# Patient Record
Sex: FEMALE | Race: WHITE | NOT HISPANIC OR LATINO | Employment: FULL TIME | URBAN - METROPOLITAN AREA
[De-identification: names, ages, dates, MRNs, and addresses within clinical notes are randomized per-mention and may not be internally consistent; named-entity substitution may affect disease eponyms.]

---

## 2017-03-01 ENCOUNTER — GENERIC CONVERSION - ENCOUNTER (OUTPATIENT)
Dept: OTHER | Facility: OTHER | Age: 62
End: 2017-03-01

## 2017-04-20 ENCOUNTER — GENERIC CONVERSION - ENCOUNTER (OUTPATIENT)
Dept: OTHER | Facility: OTHER | Age: 62
End: 2017-04-20

## 2017-05-17 ENCOUNTER — ALLSCRIPTS OFFICE VISIT (OUTPATIENT)
Dept: OTHER | Facility: OTHER | Age: 62
End: 2017-05-17

## 2017-05-17 DIAGNOSIS — R73.9 HYPERGLYCEMIA: ICD-10-CM

## 2017-06-15 ENCOUNTER — GENERIC CONVERSION - ENCOUNTER (OUTPATIENT)
Dept: OTHER | Facility: OTHER | Age: 62
End: 2017-06-15

## 2017-08-29 ENCOUNTER — LAB CONVERSION - ENCOUNTER (OUTPATIENT)
Dept: OTHER | Facility: OTHER | Age: 62
End: 2017-08-29

## 2017-08-29 ENCOUNTER — GENERIC CONVERSION - ENCOUNTER (OUTPATIENT)
Dept: OTHER | Facility: OTHER | Age: 62
End: 2017-08-29

## 2017-08-29 LAB
A/G RATIO (HISTORICAL): 1.6 (CALC) (ref 1–2.5)
ALBUMIN SERPL BCP-MCNC: 4.1 G/DL (ref 3.6–5.1)
ALP SERPL-CCNC: 68 U/L (ref 33–130)
ALT SERPL W P-5'-P-CCNC: 16 U/L (ref 6–29)
AST SERPL W P-5'-P-CCNC: 23 U/L (ref 10–35)
BILIRUB SERPL-MCNC: 0.5 MG/DL (ref 0.2–1.2)
BUN SERPL-MCNC: 17 MG/DL (ref 7–25)
BUN/CREA RATIO (HISTORICAL): ABNORMAL (CALC) (ref 6–22)
CALCIUM SERPL-MCNC: 9.1 MG/DL (ref 8.6–10.4)
CHLORIDE SERPL-SCNC: 106 MMOL/L (ref 98–110)
CHOLEST SERPL-MCNC: 154 MG/DL
CHOLEST/HDLC SERPL: 2.8 (CALC)
CO2 SERPL-SCNC: 22 MMOL/L (ref 20–31)
CREAT SERPL-MCNC: 0.68 MG/DL (ref 0.5–0.99)
EGFR AFRICAN AMERICAN (HISTORICAL): 110 ML/MIN/1.73M2
EGFR-AMERICAN CALC (HISTORICAL): 95 ML/MIN/1.73M2
GAMMA GLOBULIN (HISTORICAL): 2.5 G/DL (CALC) (ref 1.9–3.7)
GLUCOSE (HISTORICAL): 100 MG/DL (ref 65–99)
HBA1C MFR BLD HPLC: 5.2 % OF TOTAL HGB
HDLC SERPL-MCNC: 56 MG/DL
LDL CHOLESTEROL (HISTORICAL): 76 MG/DL (CALC)
NON-HDL-CHOL (CHOL-HDL) (HISTORICAL): 98 MG/DL (CALC)
POTASSIUM SERPL-SCNC: 4.3 MMOL/L (ref 3.5–5.3)
SODIUM SERPL-SCNC: 139 MMOL/L (ref 135–146)
TOTAL PROTEIN (HISTORICAL): 6.6 G/DL (ref 6.1–8.1)
TRIGL SERPL-MCNC: 139 MG/DL

## 2017-09-06 ENCOUNTER — GENERIC CONVERSION - ENCOUNTER (OUTPATIENT)
Dept: OTHER | Facility: OTHER | Age: 62
End: 2017-09-06

## 2017-09-20 ENCOUNTER — HOSPITAL ENCOUNTER (OUTPATIENT)
Dept: RADIOLOGY | Age: 62
Discharge: HOME | End: 2017-09-20
Payer: COMMERCIAL

## 2017-09-20 DIAGNOSIS — Z12.31 ENCOUNTER FOR SCREENING MAMMOGRAM FOR MALIGNANT NEOPLASM OF BREAST: ICD-10-CM

## 2017-09-20 PROCEDURE — G0202 SCR MAMMO BI INCL CAD: HCPCS

## 2017-11-13 ENCOUNTER — ALLSCRIPTS OFFICE VISIT (OUTPATIENT)
Dept: OTHER | Facility: OTHER | Age: 62
End: 2017-11-13

## 2017-11-14 NOTE — PROGRESS NOTES
Assessment    1  Hypertension (401 9) (I10)   2  Arteriosclerotic cardiovascular disease (429 2,440 9) (I25 10)   3  Hyperglycemia (790 29) (R73 9)   4  Hypercholesterolemia (272 0) (E78 00)   5  Obesity (278 00) (E66 9)    Plan  Hypercholesterolemia    · (1) LIPID PANEL, FASTING; Status:Active; Requested for:07Jwt4415;   Hyperglycemia    · (1) HEMOGLOBIN A1C; Status:Active; Requested for:50Ner1205;   Hypertension    · (1) COMPREHENSIVE METABOLIC PANEL; Status:Active; Requested for:00Zst8815; Discussion/Summary    Patient presents today for follow-up for her chronic health issues  She is feeling well clinically  Blood pressure was a bit elevated today and I would like her to check her blood pressure intermittently at home and let me know if it is consistently above 140/90, as I would bump up her metoprolol  She will have routine follow-up with Cardiology  I would like her to have some blood work done in 6 months including an A1c as her glucose readings have been just slightly elevated recently  She has gained some weight over the past several years and I would ask her to start working on decreasing the weight  has what appears to be a mucinous cyst on her left middle finger  Since she has already tried to drain this is several times, I did try cryotherapy getting a sustain freeze of the wound for about 30 seconds  Notify me if it is not improving and I can set her up for a surgical excision  Chief Complaint  6M Follow up - HTNwith Flu shot      History of Present Illness  Patient presents today for follow-up for coronary disease  She has not had any problems with chest pain, shortness of breath or palpitations  She has never taken her nitroglycerin  She does remain on metoprolol without excessive fatigue or lightheadedness  has a history of hyperlipidemia tolerate statin therapy without myalgias  has a history of hyperglycemia but without diabetes  She denies polyuria or polydipsia  complains of a lesion on her left middle finger that came on several months ago  It has been ruptured by herself and a provider at work but continues to come back  It is annoying and intermittently painful  Review of Systems   Constitutional: recent weight gain, but-- no fever,-- not feeling poorly,-- no chills,-- not feeling tired-- and-- no recent weight loss  Eyes: no eye pain-- and-- no eyesight problems  Cardiovascular: the heart rate was not slow,-- no chest pain,-- no intermittent leg claudication,-- no palpitations-- and-- no lower extremity edema  Respiratory: no shortness of breath-- and-- no cough  Gastrointestinal: no abdominal pain,-- no nausea-- and-- no constipation  Musculoskeletal: no arthralgias  Integumentary: no rashes  Neurological: no headache,-- no numbness-- and-- no dizziness  Hematologic/Lymphatic: no tendency for easy bleeding-- and-- no tendency for easy bruising  Active Problems  1  Arteriosclerotic cardiovascular disease (429 2,440 9) (I25 10)   2  ASCVD (arteriosclerotic cardiovascular disease) (429 2,440 9) (I25 10)   3  Glaucoma (365 9) (H40 9)   4  Hypercholesterolemia (272 0) (E78 00)   5  Hyperglycemia (790 29) (R73 9)   6  Hypertension (401 9) (I10)   7  Need for Zostavax administration (V04 89) (Z23)   8  Obesity (278 00) (E66 9)   9  Screening for cervical cancer (V76 2) (Z12 4)    Past Medical History  1  History of Acute upper respiratory infection (465 9) (J06 9)   2  History of Avulsion fracture (829 0) (T14 8XXA)   3  History of Cellulitis of ankle (682 6) (L03 119)   4  History of Chronic fatigue (780 79) (R53 82)   5  History of Contact dermatitis due to poison ivy (692 6) (L23 7)   6  History of Edema (782 3) (R60 9)   7  History of acute bronchitis (V12 69) (Z87 09)   8  History of Knee pain, left (719 46) (M25 562)   9  History of Plantar fasciitis (728 71) (M72 2)   10  History of Tendonitis, Achilles (726 71) (M76 60)   11   History of Trigger middle finger of right hand (727 03) (M65 331)    The active problems and past medical history were reviewed and updated today  Surgical History  1  History of Cardiac Cath Procedure Outcome:   2  History of Cath Stent Placement   3  History of Cath Stent Placement   4  History of Cath Stent Placement   5  History of Complete Colonoscopy   6  History of Endoscopic Retrograde Cholangiopancreatography (ERCP)   7  History of Incisional Breast Biopsy    The surgical history was reviewed and updated today  Family History  Mother    1  Family history of Angina Pectoris   2  Family history of Diabetes Mellitus (V18 0)   3  Family history of Septicemia  Father    4  Family history of Laryngeal Cancer (V16 2)  Maternal Uncle    5  Family history of Colon Cancer (V16 0)    The family history was reviewed and updated today  Social History     · Alcohol Use (History)   · Former smoker (S92 70) (X78 211)  The social history was reviewed and updated today  Current Meds   1  Aspirin 325 MG Oral Tablet; Therapy: (Elnoria Halter) to Recorded   2  Caltrate 600+D TABS; Therapy: (Recorded:24Fzy5833) to Recorded   3  Flax Seed Oil CAPS; Therapy: (Elnoria Halter) to Recorded   4  Metoprolol Succinate ER 25 MG Oral Tablet Extended Release 24 Hour; TAKE 1 TABLET DAILY; Therapy: 09PCE4698 to (EFSJEPKE:98CYN1218)  Requested for: 85GXH4996; Last Rx:31Jan2017 Ordered   5  Nitrostat 0 4 MG Sublingual Tablet Sublingual; PLACE 1 TABLET UNDER THE TONGUE EVERY 5 MINUTES UP TO 3 DOSES AS NEEDED FOR CHEST PAIN; Therapy: 11ZCP7526 to (Evaluate:16Jun2017)  Requested for: 24WBK3901; Last Rx:21Wtx8126 Ordered   6  Simvastatin 20 MG Oral Tablet; take 1 tablet by mouth daily; Therapy: 83KFP9392 to 96 772368)  Requested for: 41DNZ7063; Last Rx:31Jan2017 Ordered    The medication list was reviewed and updated today  Allergies  1  EPINEPHrine Base AERS   2   Lisinopril TABS    Vitals  Vital Signs    Recorded: 86RZI0413 05:07PM Recorded: 44AUA1803 04:46PM   Heart Rate  60   Respiration  16   Systolic 247 374   Diastolic 88 88   Height  5 ft 2 in   Weight  228 lb    BMI Calculated  41 7   BSA Calculated  2 02   O2 Saturation  98, RA       Physical Exam   Constitutional  General appearance: No acute distress, well appearing and well nourished  Eyes  Conjunctiva and lids: No swelling, erythema or discharge  Pulmonary  Respiratory effort: No increased work of breathing or signs of respiratory distress  Auscultation of lungs: Clear to auscultation  Cardiovascular  Auscultation of heart: Normal rate and rhythm, normal S1 and S2, without murmurs  Abdomen  Abdomen: Non-tender, no masses  Skin  Skin and subcutaneous tissue: Normal without rashes or lesions  Examination of the skin for lesions: Abnormal  -- She has a raised red lesion that is 5 x 5 mm on her dorsal left middle finger  Neurologic  Cranial nerves: Cranial nerves 2-12 intact  Psychiatric  Orientation to person, place, and time: Normal    Mood and affect: Normal          Health Management  Screening for cervical cancer   (1) THIN PREP PAP FOLLOW UP WITH IMAGING; every 3 years; Next Due: [id number]; Overdue  (1) THIN PREP PAP WITH IMAGING; every 1 year; Last 28IFV2880; Next Due: 36TAU6802; Overdue  Health Maintenance   Digital Bilateral Screening Mammogram With CAD; every 1 year; Last 00Wyg7582; Next OGV:69WBB1931;  Overdue    Signatures   Electronically signed by : STEPHAN Enamorado ; Nov 13 2017  5:15PM EST                       (Author)

## 2018-01-09 NOTE — RESULT NOTES
Verified Results  (1) LIPID PANEL, FASTING 52Mxq5551 12:00AM Elizabeth Hargrove     Test Name Result Flag Reference   CHOLESTEROL, TOTAL 154 mg/dL  <200   HDL CHOLESTEROL 56 mg/dL  >68   TRIGLICERIDES 341 mg/dL  <150   LDL-CHOLESTEROL 76 mg/dL (calc)     Reference range: <100     Desirable range <100 mg/dL for patients with CHD or  diabetes and <70 mg/dL for diabetic patients with  known heart disease  The [facility] calculation is a validated novel   method that provides better accuracy than the   Friedewald equation in the estimation of LDL-C,   particularly when TG levels are 150-400 mg/dL and   LDL-C levels are lower than 70 mg/dL  Reference:  Chucho Manzo et al  Comparison of a Novel   Method vs the Friedewald Equation for Estimating   Low-Density Lipoprotein Cholesterol Levels From the   Standard Lipid Profile  DIDI  6996;462(61): 0246-1981  For additional information, please refer to  http://Britely/faq/TTB073  (This link is being provided for informational/  educational purposes only )   CHOL/HDLC RATIO 2 8 (calc)  <5 0   NON HDL CHOLESTEROL 98 mg/dL (calc)  <130   For patients with diabetes plus 1 major ASCVD risk   factor, treating to a non-HDL-C goal of <100 mg/dL   (LDL-C of <70 mg/dL) is considered a therapeutic   option  (1) COMPREHENSIVE METABOLIC PANEL 14VRP1740 57:94RW Dayanna Palumbo     Test Name Result Flag Reference   GLUCOSE 100 mg/dL H 65-99   Fasting reference interval     For someone without known diabetes, a glucose value  between 100 and 125 mg/dL is consistent with  prediabetes and should be confirmed with a  follow-up test    UREA NITROGEN (BUN) 17 mg/dL  7-25   CREATININE 0 68 mg/dL  0 50-0 99   For patients >52years of age, the reference limit  for Creatinine is approximately 13% higher for people  identified as -American  eGFR NON-AFR   AMERICAN 95 mL/min/1 73m2  > OR = 60   eGFR AFRICAN AMERICAN 110 mL/min/1 73m2  > OR = 60 BUN/CREATININE RATIO   3-63   NOT APPLICABLE (calc)   SODIUM 139 mmol/L  135-146   POTASSIUM 4 3 mmol/L  3 5-5 3   CHLORIDE 106 mmol/L     CARBON DIOXIDE 22 mmol/L  20-31   CALCIUM 9 1 mg/dL  8 6-10 4   PROTEIN, TOTAL 6 6 g/dL  6 1-8 1   ALBUMIN 4 1 g/dL  3 6-5 1   GLOBULIN 2 5 g/dL (calc)  1 9-3 7   ALBUMIN/GLOBULIN RATIO 1 6 (calc)  1 0-2 5   BILIRUBIN, TOTAL 0 5 mg/dL  0 2-1 2   ALKALINE PHOSPHATASE 68 U/L     AST 23 U/L  10-35   ALT 16 U/L  6-29     (Q) HEMOGLOBIN A1c 06Sjb2472 12:00AM Ryland Nails   REPORT COMMENT:  FASTING:YES     Test Name Result Flag Reference   HEMOGLOBIN A1c 5 2 % of total Hgb  <5 7   For the purpose of screening for the presence of  diabetes:     <5 7%       Consistent with the absence of diabetes  5 7-6 4%    Consistent with increased risk for diabetes              (prediabetes)  > or =6 5%  Consistent with diabetes     This assay result is consistent with a decreased risk  of diabetes  Currently, no consensus exists regarding use of  hemoglobin A1c for diagnosis of diabetes in children  According to American Diabetes Association (ADA)  guidelines, hemoglobin A1c <7 0% represents optimal  control in non-pregnant diabetic patients  Different  metrics may apply to specific patient populations  Standards of Medical Care in Diabetes(ADA)

## 2018-01-13 VITALS
RESPIRATION RATE: 16 BRPM | HEIGHT: 62 IN | HEART RATE: 60 BPM | WEIGHT: 228 LBS | BODY MASS INDEX: 41.96 KG/M2 | SYSTOLIC BLOOD PRESSURE: 148 MMHG | DIASTOLIC BLOOD PRESSURE: 88 MMHG | OXYGEN SATURATION: 98 %

## 2018-01-14 VITALS
HEIGHT: 62 IN | DIASTOLIC BLOOD PRESSURE: 90 MMHG | BODY MASS INDEX: 41.68 KG/M2 | WEIGHT: 226.5 LBS | SYSTOLIC BLOOD PRESSURE: 118 MMHG | HEART RATE: 60 BPM

## 2018-02-07 DIAGNOSIS — E78.00 HYPERCHOLESTEROLEMIA: ICD-10-CM

## 2018-02-07 DIAGNOSIS — I25.10 ASCVD (ARTERIOSCLEROTIC CARDIOVASCULAR DISEASE): Primary | ICD-10-CM

## 2018-02-07 PROBLEM — R73.9 HYPERGLYCEMIA: Status: ACTIVE | Noted: 2017-05-17

## 2018-02-07 RX ORDER — SIMVASTATIN 20 MG
TABLET ORAL
Qty: 90 TABLET | Refills: 3 | Status: SHIPPED | OUTPATIENT
Start: 2018-02-07 | End: 2019-02-15 | Stop reason: SDUPTHER

## 2018-02-07 RX ORDER — METOPROLOL SUCCINATE 25 MG/1
TABLET, EXTENDED RELEASE ORAL
Qty: 90 TABLET | Refills: 3 | Status: SHIPPED | OUTPATIENT
Start: 2018-02-07 | End: 2019-02-15 | Stop reason: SDUPTHER

## 2018-03-07 NOTE — PROGRESS NOTES
History of Present Illness    Revaccination   Vaccine Information: Vaccine(s) Given (names): Prevnar 13 12/01/2015  Pt called (attempt 1): [id number] nw  l/m  Pt called (attempt 2): [id number] 9795 nw   l/m  Pt called (attempt 3): [id number] 1641 nw   l/m  Letter Sent (Regular and Certified): [id number] nw    Revaccination Completed: [id number]  Active Problems    1  Arteriosclerotic cardiovascular disease (429 2,440 9) (I25 10)   2  ASCVD (arteriosclerotic cardiovascular disease) (429 2,440 9) (I25 10)   3  Glaucoma (365 9) (H40 9)   4  Hypercholesterolemia (272 0) (E78 00)   5  Hyperlipidemia (272 4) (E78 5)   6  Hypertension (401 9) (I10)   7  Need for vaccination with 13-polyvalent pneumococcal conjugate vaccine (V03 82) (Z23)   8  Obesity (278 00) (E66 9)   9  Screening for cervical cancer (V76 2) (Z12 4)    Immunizations   Influenza --- Verl Nones: [id number] Maki: 27-Sep-2013; Vanessa Del Valle: 01-Sep-2015; Series4:  04-Oct-2016   PPSV --- Verl Nones: 02-Dec-2007   Tdap --- Series1: [id number]     Prevnar 13 Intramuscular Suspension [id number]     Current Meds   1  Aspirin 325 MG Oral Tablet   2  Caltrate 600+D TABS   3  Flax Seed Oil CAPS   4  Metoprolol Succinate ER 25 MG Oral Tablet Extended Release 24 Hour; TAKE 1 TABLET   DAILY   5  Nitrostat 0 4 MG Sublingual Tablet Sublingual; PLACE 1 TABLET UNDER THE TONGUE   EVERY 5 MINUTES UP TO 3 DOSES AS NEEDED FOR CHEST PAIN   6  Simvastatin 20 MG Oral Tablet; take 1 tablet by mouth daily    Allergies    1  EPINEPHrine Base AERS   2  Lisinopril TABS    Future Appointments    Date/Time Provider Specialty Site   04/17/2017 04:00 PM STEPHAN Greer   [facility]     Signatures   Electronically signed by : STEPHAN Santillan ; Jan 6 2017 11:16AM EST

## 2018-05-13 DIAGNOSIS — I10 ESSENTIAL (PRIMARY) HYPERTENSION: ICD-10-CM

## 2018-05-13 DIAGNOSIS — E78.00 PURE HYPERCHOLESTEROLEMIA: ICD-10-CM

## 2018-05-13 DIAGNOSIS — R73.9 HYPERGLYCEMIA: ICD-10-CM

## 2018-05-18 LAB
ALBUMIN SERPL-MCNC: 4.4 G/DL (ref 3.6–5.1)
ALBUMIN/GLOB SERPL: 1.8 (CALC) (ref 1–2.5)
ALP SERPL-CCNC: 88 U/L (ref 33–130)
ALT SERPL-CCNC: 20 U/L (ref 6–29)
AST SERPL-CCNC: 28 U/L (ref 10–35)
BILIRUB SERPL-MCNC: 0.9 MG/DL (ref 0.2–1.2)
BUN SERPL-MCNC: 11 MG/DL (ref 7–25)
BUN/CREAT SERPL: NORMAL (CALC) (ref 6–22)
CALCIUM SERPL-MCNC: 9.7 MG/DL (ref 8.6–10.4)
CHLORIDE SERPL-SCNC: 105 MMOL/L (ref 98–110)
CHOLEST SERPL-MCNC: 108 MG/DL
CHOLEST/HDLC SERPL: 1.8 (CALC)
CO2 SERPL-SCNC: 24 MMOL/L (ref 20–31)
CREAT SERPL-MCNC: 0.8 MG/DL (ref 0.5–0.99)
GLOBULIN SER CALC-MCNC: 2.5 G/DL (CALC) (ref 1.9–3.7)
GLUCOSE SERPL-MCNC: 94 MG/DL (ref 65–99)
HBA1C MFR BLD: 5.2 % OF TOTAL HGB
HDLC SERPL-MCNC: 60 MG/DL
LDLC SERPL CALC-MCNC: 34 MG/DL (CALC)
NONHDLC SERPL-MCNC: 48 MG/DL (CALC)
POTASSIUM SERPL-SCNC: 4.5 MMOL/L (ref 3.5–5.3)
PROT SERPL-MCNC: 6.9 G/DL (ref 6.1–8.1)
SL AMB EGFR AFRICAN AMERICAN: 92 ML/MIN/1.73M2
SL AMB EGFR NON AFRICAN AMERICAN: 80 ML/MIN/1.73M2
SODIUM SERPL-SCNC: 140 MMOL/L (ref 135–146)
TRIGL SERPL-MCNC: 65 MG/DL

## 2018-05-22 RX ORDER — PERPHENAZINE/AMITRIPTYLINE HCL 2 MG-25 MG
TABLET ORAL
COMMUNITY

## 2018-05-22 RX ORDER — NITROGLYCERIN 0.4 MG/1
1 TABLET SUBLINGUAL
COMMUNITY
Start: 2011-05-06 | End: 2018-12-24 | Stop reason: SDUPTHER

## 2018-05-23 ENCOUNTER — OFFICE VISIT (OUTPATIENT)
Dept: FAMILY MEDICINE CLINIC | Facility: CLINIC | Age: 63
End: 2018-05-23
Payer: COMMERCIAL

## 2018-05-23 VITALS
RESPIRATION RATE: 16 BRPM | OXYGEN SATURATION: 97 % | BODY MASS INDEX: 40.85 KG/M2 | HEIGHT: 62 IN | WEIGHT: 222 LBS | DIASTOLIC BLOOD PRESSURE: 80 MMHG | HEART RATE: 60 BPM | SYSTOLIC BLOOD PRESSURE: 128 MMHG

## 2018-05-23 DIAGNOSIS — R73.9 HYPERGLYCEMIA: ICD-10-CM

## 2018-05-23 DIAGNOSIS — I25.10 ASCVD (ARTERIOSCLEROTIC CARDIOVASCULAR DISEASE): ICD-10-CM

## 2018-05-23 DIAGNOSIS — E78.00 HYPERCHOLESTEROLEMIA: ICD-10-CM

## 2018-05-23 DIAGNOSIS — I10 ESSENTIAL HYPERTENSION: ICD-10-CM

## 2018-05-23 DIAGNOSIS — Z23 NEED FOR ZOSTER VACCINE: ICD-10-CM

## 2018-05-23 DIAGNOSIS — E66.01 CLASS 3 SEVERE OBESITY DUE TO EXCESS CALORIES WITH SERIOUS COMORBIDITY AND BODY MASS INDEX (BMI) OF 40.0 TO 44.9 IN ADULT (HCC): ICD-10-CM

## 2018-05-23 DIAGNOSIS — Z00.00 WELL ADULT ON ROUTINE HEALTH CHECK: Primary | ICD-10-CM

## 2018-05-23 DIAGNOSIS — Z11.59 NEED FOR HEPATITIS C SCREENING TEST: ICD-10-CM

## 2018-05-23 DIAGNOSIS — Z23 NEED FOR SHINGLES VACCINE: ICD-10-CM

## 2018-05-23 DIAGNOSIS — G47.33 OBSTRUCTIVE SLEEP APNEA SYNDROME: ICD-10-CM

## 2018-05-23 PROCEDURE — 99396 PREV VISIT EST AGE 40-64: CPT | Performed by: FAMILY MEDICINE

## 2018-05-23 NOTE — PROGRESS NOTES
FAMILY PRACTICE HEALTH MAINTENANCE OFFICE VISIT  [facility]    NAME: Sunil Osuna  AGE: 65 y o  SEX: female  : 1955     DATE: 2018    Assessment and Plan     Problem List Items Addressed This Visit     ASCVD (arteriosclerotic cardiovascular disease)       Well controlled  Continue cardiology follow-up  Relevant Orders    Comprehensive metabolic panel    Hypercholesterolemia      Recent lipids were outstanding  Continue to monitor annually  Relevant Orders    Comprehensive metabolic panel    Hyperglycemia       Recent A1c was excellent  Repeat A1c in 6 months  Continue with exercise  Relevant Orders    HEMOGLOBIN A1C W/ EAG ESTIMATION    Essential hypertension       Blood pressure is very well controlled  Check CMP in 6 months         Relevant Orders    Comprehensive metabolic panel    Class 3 obesity in adult       Continue with daily exercise  I discussed possibly increasing her walking pace and distance a bit which may help with weight as well as persistent dietary regulations  Obstructive sleep apnea syndrome      Continue on CPAP  Other Visit Diagnoses     Well adult on routine health check    -  Primary    Need for hepatitis C screening test        Relevant Orders    Hepatitis C antibody    Need for zoster vaccine        Relevant Medications    Zoster Vac Recomb Adjuvanted ([address]) 48 MCG SUSR    Need for shingles vaccine                · Patient Counseling:   · Nutrition: Stressed importance of a well balanced diet, moderation of sodium/saturated fat, caloric balance and sufficient intake of fiber  · Exercise: Stressed the importance of regular exercise with a goal of 150 minutes per week  · Dental Health: Discussed daily flossing and brushing and regular dental visits     · Immunizations reviewed  Up-to-date on all vaccines except for Shingrix    I will give her a prescription for this   · Discussed benefits of screening   Patient is up-to-date  · Discussed the patient's BMI with her  The BMI is above average; BMI management plan is completed     Return in about 6 months (around 11/23/2018)  Chief Complaint     Chief Complaint   Patient presents with    Physical Exam       History of Present Illness     HPI    Well Adult Physical   Patient here for a comprehensive physical exam    Patient presents today for annual physical   She has history of coronary disease but has been doing quite well  She has exercise routinely  She has had no chest pain, shortness of breath, palpitations or lightheadedness  She has hyperlipidemia tolerate statin therapy without myalgias  She recently was diagnosed by her cardiologist with sleep apnea and is tolerating CPAP  Unfortunately, she has noted no significant increase in energy but denies excessive fatigue  She has history of obesity and elevated glucose  She denies polyuria or polydipsia  She is exercising daily and trying to watch her diet  She is up-to-date on mammogram and colonoscopy  She does not require DEXA at this time        Diet and Physical Activity  Diet: well balanced diet  Weight concerns: Patient has class 3 obesity (BMI >40)  Exercise: daily      Depression Screen  PHQ-9 Depression Screening    PHQ-9:    Frequency of the following problems over the past two weeks:       Little interest or pleasure in doing things:  0 - not at all  Feeling down, depressed, or hopeless:  0 - not at all  PHQ-2 Score:  0          General Health  Hearing: Normal:  bilateral  Vision: goes for regular eye exams  Dental: regular dental visits    Reproductive Health  No concerns - not currently sexually active      The following portions of the patient's history were reviewed and updated as appropriate: allergies, current medications, past family history, past medical history, past social history, past surgical history and problem list     Review of Systems     Review of Systems   Constitutional: Negative for appetite change, chills, fatigue, fever and unexpected weight change  HENT: Negative for trouble swallowing  Eyes: Negative for visual disturbance  Respiratory: Negative for cough, chest tightness, shortness of breath and wheezing  Cardiovascular: Negative for chest pain  Gastrointestinal: Negative for abdominal distention, abdominal pain, blood in stool, constipation and diarrhea  Endocrine: Negative for polyuria  Genitourinary: Negative for difficulty urinating and flank pain  Musculoskeletal: Negative for arthralgias and myalgias  Skin: Negative for rash  Neurological: Negative for dizziness and light-headedness  Hematological: Negative for adenopathy  Does not bruise/bleed easily  Psychiatric/Behavioral: Negative for sleep disturbance         Past Medical History     Past Medical History:   Diagnosis Date    Avulsion fracture     Last Assessed:10/21/2014    Cellulitis of ankle     Last Assessed:7/18/2014    Chronic fatigue     Last Assessed:12/1/2015    Edema     Last Assessed:7/18/2014    Plantar fasciitis     Last Assessed:9/27/2013    Trigger middle finger of right hand     Last Assessed:2/19/2014       Past Surgical History     Past Surgical History:   Procedure Laterality Date    BREAST BIOPSY      Incisional    CARDIAC CATHETERIZATION      COLONOSCOPY      7/14    CORONARY ANGIOPLASTY WITH STENT PLACEMENT      CORONARY ANGIOPLASTY WITH STENT PLACEMENT      11/07    ERCP      1988       Social History     Social History     Social History    Marital status: Single     Spouse name: N/A    Number of children: N/A    Years of education: N/A     Social History Main Topics    Smoking status: Former Smoker    Smokeless tobacco: Never Used    Alcohol use Yes    Drug use: Unknown    Sexual activity: Not Asked     Other Topics Concern    None     Social History Narrative    None       Family History     Family History   Problem Relation Age of Onset    Other Mother      angina pectoris  and septicemia    Diabetes Mother     Cancer Father      laryngeal cancer    Colon cancer Daughter        Current Medications       Current Outpatient Prescriptions:     aspirin 81 MG tablet, Take by mouth, Disp: , Rfl:     Calcium Carb-Cholecalciferol (CALTRATE 600+D) 600-800 MG-UNIT TABS, Take by mouth, Disp: , Rfl:     Flaxseed, Linseed, (FLAX SEED OIL) 1300 MG CAPS, Take by mouth, Disp: , Rfl:     metoprolol succinate (TOPROL-XL) 25 mg 24 hr tablet, TAKE 1 TABLET DAILY  , Disp: 90 tablet, Rfl: 3    nitroglycerin (NITROSTAT) 0 4 mg SL tablet, Place 1 tablet under the tongue every 5 (five) minutes as needed, Disp: , Rfl:     simvastatin (ZOCOR) 20 mg tablet, TAKE 1 TABLET BY MOUTH DAILY, Disp: 90 tablet, Rfl: 3    Zoster Vac Recomb Adjuvanted (SHINGRIX) 50 MCG SUSR, Inject 0 5 mL into the shoulder, thigh, or buttocks once for 1 dose, Disp: 1 each, Rfl: 0     Allergies     Allergies   Allergen Reactions    Lisinopril Cough    Epinephrine Nausea Only and Palpitations     Other reaction(s): Palpitations       Objective     /80   Pulse 60   Resp 16   Ht 5' 2" (1 575 m)   Wt 101 kg (222 lb)   SpO2 97%   BMI 40 60 kg/m²      Physical Exam   Constitutional: She is oriented to person, place, and time  She appears well-developed and well-nourished  No distress  HENT:   Head: Normocephalic  Eyes: Pupils are equal, round, and reactive to light  Neck: No tracheal deviation present  No thyromegaly present  Cardiovascular: Normal rate, regular rhythm and normal heart sounds  No murmur heard  Pulmonary/Chest: Effort normal  No respiratory distress  She has no wheezes  She has no rales  Abdominal: Soft  She exhibits no distension  There is no tenderness  Musculoskeletal: Normal range of motion  She exhibits no tenderness  Neurological: She is alert and oriented to person, place, and time   No cranial nerve deficit  Skin: Skin is warm  She is not diaphoretic  Psychiatric: She has a normal mood and affect   Judgment and thought content normal          No exam data present    Health Maintenance     Health Maintenance   Topic Date Due    HIV SCREENING  1955    Hepatitis C Screening  1955    DXA SCAN  07/05/2017    INFLUENZA VACCINE  09/01/2018    PAP SMEAR  09/09/2018    MAMMOGRAM  09/20/2018    Depression Screening PHQ-9  05/23/2019    DTaP,Tdap,and Td Vaccines (2 - Td) 06/22/2021    COLONOSCOPY  07/10/2024     Immunization History   Administered Date(s) Administered     Influenza (IM) Preservative Free 10/03/2014    H1N1, All Formulations 01/15/2010    Influenza Quadrivalent Preservative Free 3 years and older IM 10/01/2017    Influenza Quadrivalent, 6-35 Months IM 09/01/2015, 10/04/2016    Influenza TIV (IM) 09/11/2012, 09/27/2013    Pneumococcal Conjugate 13-Valent 12/01/2015, 01/05/2017    Pneumococcal Polysaccharide PPV23 12/02/2007    Tdap 06/22/2011    Zoster 07/05/2017       Alisson Hutton MD  [facility]

## 2018-05-23 NOTE — ASSESSMENT & PLAN NOTE
Continue with daily exercise  I discussed possibly increasing her walking pace and distance a bit which may help with weight as well as persistent dietary regulations

## 2018-09-26 ENCOUNTER — HOSPITAL ENCOUNTER (OUTPATIENT)
Dept: RADIOLOGY | Age: 63
Discharge: HOME | End: 2018-09-26
Payer: COMMERCIAL

## 2018-09-26 DIAGNOSIS — Z12.31 ENCOUNTER FOR SCREENING MAMMOGRAM FOR MALIGNANT NEOPLASM OF BREAST: ICD-10-CM

## 2018-09-26 PROCEDURE — 77067 SCR MAMMO BI INCL CAD: CPT

## 2018-11-16 LAB — HBA1C MFR BLD HPLC: 5.2 %

## 2018-12-06 ENCOUNTER — TELEPHONE (OUTPATIENT)
Dept: FAMILY MEDICINE CLINIC | Facility: CLINIC | Age: 63
End: 2018-12-06

## 2018-12-07 NOTE — TELEPHONE ENCOUNTER
Had labs drawn thru work  Scanned from [address] [place] [address] Lexim , Can you please review this so we can let her know?

## 2018-12-24 ENCOUNTER — OFFICE VISIT (OUTPATIENT)
Dept: FAMILY MEDICINE CLINIC | Facility: CLINIC | Age: 63
End: 2018-12-24
Payer: COMMERCIAL

## 2018-12-24 VITALS
SYSTOLIC BLOOD PRESSURE: 130 MMHG | OXYGEN SATURATION: 97 % | HEART RATE: 68 BPM | RESPIRATION RATE: 18 BRPM | DIASTOLIC BLOOD PRESSURE: 80 MMHG | BODY MASS INDEX: 40.3 KG/M2 | WEIGHT: 219 LBS | HEIGHT: 62 IN

## 2018-12-24 DIAGNOSIS — G47.33 OBSTRUCTIVE SLEEP APNEA SYNDROME: ICD-10-CM

## 2018-12-24 DIAGNOSIS — E66.01 CLASS 3 SEVERE OBESITY DUE TO EXCESS CALORIES WITH SERIOUS COMORBIDITY AND BODY MASS INDEX (BMI) OF 40.0 TO 44.9 IN ADULT (HCC): ICD-10-CM

## 2018-12-24 DIAGNOSIS — I10 ESSENTIAL HYPERTENSION: ICD-10-CM

## 2018-12-24 DIAGNOSIS — I11.9 HYPERTENSIVE HEART DISEASE WITHOUT CONGESTIVE HEART FAILURE: Primary | ICD-10-CM

## 2018-12-24 DIAGNOSIS — R73.9 HYPERGLYCEMIA: ICD-10-CM

## 2018-12-24 DIAGNOSIS — I25.10 ASCVD (ARTERIOSCLEROTIC CARDIOVASCULAR DISEASE): ICD-10-CM

## 2018-12-24 DIAGNOSIS — E78.00 HYPERCHOLESTEROLEMIA: ICD-10-CM

## 2018-12-24 DIAGNOSIS — Z11.59 NEED FOR HEPATITIS C SCREENING TEST: ICD-10-CM

## 2018-12-24 DIAGNOSIS — M65.332 TRIGGER MIDDLE FINGER OF LEFT HAND: ICD-10-CM

## 2018-12-24 PROCEDURE — 3075F SYST BP GE 130 - 139MM HG: CPT | Performed by: FAMILY MEDICINE

## 2018-12-24 PROCEDURE — 3079F DIAST BP 80-89 MM HG: CPT | Performed by: FAMILY MEDICINE

## 2018-12-24 PROCEDURE — 99214 OFFICE O/P EST MOD 30 MIN: CPT | Performed by: FAMILY MEDICINE

## 2018-12-24 PROCEDURE — 20550 NJX 1 TENDON SHEATH/LIGAMENT: CPT | Performed by: FAMILY MEDICINE

## 2018-12-24 RX ORDER — TRIAMCINOLONE ACETONIDE 40 MG/ML
20 INJECTION, SUSPENSION INTRA-ARTICULAR; INTRAMUSCULAR
Status: COMPLETED | OUTPATIENT
Start: 2018-12-24 | End: 2018-12-24

## 2018-12-24 RX ORDER — NITROGLYCERIN 0.4 MG/1
0.4 TABLET SUBLINGUAL
Qty: 30 TABLET | Refills: 1 | Status: SHIPPED | OUTPATIENT
Start: 2018-12-24

## 2018-12-24 RX ORDER — LIDOCAINE HYDROCHLORIDE 5 MG/ML
0.5 INJECTION, SOLUTION INFILTRATION; PERINEURAL
Status: COMPLETED | OUTPATIENT
Start: 2018-12-24 | End: 2018-12-24

## 2018-12-24 RX ADMIN — LIDOCAINE HYDROCHLORIDE 0.5 ML: 5 INJECTION, SOLUTION INFILTRATION; PERINEURAL at 08:59

## 2018-12-24 RX ADMIN — TRIAMCINOLONE ACETONIDE 20 MG: 40 INJECTION, SUSPENSION INTRA-ARTICULAR; INTRAMUSCULAR at 08:59

## 2018-12-24 NOTE — PATIENT INSTRUCTIONS
Obesity   AMBULATORY CARE:   Obesity  is when your body mass index (BMI) is greater than 30  Your healthcare provider will use your height and weight to measure your BMI  The risks of obesity include  many health problems, such as injuries or physical disability  You may need tests to check for the following:  · Diabetes     · High blood pressure or high cholesterol     · Heart disease     · Gallbladder or liver disease     · Cancer of the colon, breast, prostate, liver, or kidney     · Sleep apnea     · Arthritis or gout  Seek care immediately if:   · You have a severe headache, confusion, or difficulty speaking  · You have weakness on one side of your body  · You have chest pain, sweating, or shortness of breath  Contact your healthcare provider if:   · You have symptoms of gallbladder or liver disease, such as pain in your upper abdomen  · You have knee or hip pain and discomfort while walking  · You have symptoms of diabetes, such as intense hunger and thirst, and frequent urination  · You have symptoms of sleep apnea, such as snoring or daytime sleepiness  · You have questions or concerns about your condition or care  Treatment for obesity  focuses on helping you lose weight to improve your health  Even a small decrease in BMI can reduce the risk for many health problems  Your healthcare provider will help you set a weight-loss goal   · Lifestyle changes  are the first step in treating obesity  These include making healthy food choices and getting regular physical activity  Your healthcare provider may suggest a weight-loss program that involves coaching, education, and therapy  · Medicine  may help you lose weight when it is used with a healthy diet and physical activity  · Surgery  can help you lose weight if you are very obese and have other health problems  There are several types of weight-loss surgery  Ask your healthcare provider for more information    Be successful losing weight:   · Set small, realistic goals  An example of a small goal is to walk for 20 minutes 5 days a week  Anther goal is to lose 5% of your body weight  · Tell friends, family members, and coworkers about your goals  and ask for their support  Ask a friend to lose weight with you, or join a weight-loss support group  · Identify foods or triggers that may cause you to overeat , and find ways to avoid them  Remove tempting high-calorie foods from your home and workplace  Place a bowl of fresh fruit on your kitchen counter  If stress causes you to eat, then find other ways to cope with stress  · Keep a diary to track what you eat and drink  Also write down how many minutes of physical activity you do each day  Weigh yourself once a week and record it in your diary  Eating changes: You will need to eat 500 to 1,000 fewer calories each day than you currently eat to lose 1 to 2 pounds a week  The following changes will help you cut calories:  · Eat smaller portions  Use small plates, no larger than 9 inches in diameter  Fill your plate half full of fruits and vegetables  Measure your food using measuring cups until you know what a serving size looks like  · Eat 3 meals and 1 or 2 snacks each day  Plan your meals in advance  [place] and eat at home most of the time  Eat slowly  · Eat fruits and vegetables at every meal   They are low in calories and high in fiber, which makes you feel full  Do not add butter, margarine, or cream sauce to vegetables  Use herbs to season steamed vegetables  · Eat less fat and fewer fried foods  Eat more baked or grilled chicken and fish  These protein sources are lower in calories and fat than red meat  Limit fast food  Dress your salads with olive oil and vinegar instead of bottled dressing  · Limit the amount of sugar you eat  Do not drink sugary beverages  Limit alcohol  Activity changes:  Physical activity is good for your body in many ways   It helps you burn calories and build strong muscles  It decreases stress and depression, and improves your mood  It can also help you sleep better  Talk to your healthcare provider before you begin an exercise program   · Exercise for at least 30 minutes 5 days a week  Start slowly  Set aside time each day for physical activity that you enjoy and that is convenient for you  It is best to do both weight training and an activity that increases your heart rate, such as walking, bicycling, or swimming  · Find ways to be more active  Do yard work and housecleaning  Walk up the stairs instead of using elevators  Spend your leisure time going to events that require walking, such as outdoor festivals or fairs  This extra physical activity can help you lose weight and keep it off  Follow up with your healthcare provider as directed: You may need to meet with a dietitian  Write down your questions so you remember to ask them during your visits  © 2017 [employer] Information is for End User's use only and may not be sold, redistributed or otherwise used for commercial purposes  All illustrations and images included in CareNotes® are the copyrighted property of [employer]  or [employer]  The above information is an  only  It is not intended as medical advice for individual conditions or treatments  Talk to your doctor, nurse or pharmacist before following any medical regimen to see if it is safe and effective for you

## 2018-12-24 NOTE — ASSESSMENT & PLAN NOTE
I performed a trigger finger injection which she tolerated  Her last 1 lasted about a year  Call me if she is not improving and I will set her up with a hand surgeon

## 2019-02-15 DIAGNOSIS — E78.00 HYPERCHOLESTEROLEMIA: ICD-10-CM

## 2019-02-15 DIAGNOSIS — I25.10 ASCVD (ARTERIOSCLEROTIC CARDIOVASCULAR DISEASE): ICD-10-CM

## 2019-02-15 RX ORDER — SIMVASTATIN 20 MG
TABLET ORAL
Qty: 90 TABLET | Refills: 3 | Status: SHIPPED | OUTPATIENT
Start: 2019-02-15 | End: 2020-07-15 | Stop reason: SDUPTHER

## 2019-02-15 RX ORDER — METOPROLOL SUCCINATE 25 MG/1
TABLET, EXTENDED RELEASE ORAL
Qty: 90 TABLET | Refills: 3 | Status: SHIPPED | OUTPATIENT
Start: 2019-02-15 | End: 2020-07-15 | Stop reason: SDUPTHER

## 2019-07-01 LAB — HBA1C MFR BLD HPLC: 5.3 %

## 2019-07-08 ENCOUNTER — OFFICE VISIT (OUTPATIENT)
Dept: FAMILY MEDICINE CLINIC | Facility: CLINIC | Age: 64
End: 2019-07-08
Payer: COMMERCIAL

## 2019-07-08 VITALS
BODY MASS INDEX: 40.85 KG/M2 | SYSTOLIC BLOOD PRESSURE: 135 MMHG | DIASTOLIC BLOOD PRESSURE: 80 MMHG | WEIGHT: 222 LBS | RESPIRATION RATE: 18 BRPM | OXYGEN SATURATION: 97 % | HEIGHT: 62 IN | HEART RATE: 60 BPM

## 2019-07-08 DIAGNOSIS — E78.00 HYPERCHOLESTEROLEMIA: ICD-10-CM

## 2019-07-08 DIAGNOSIS — E66.01 OBESITY, CLASS III, BMI 40-49.9 (MORBID OBESITY) (HCC): ICD-10-CM

## 2019-07-08 DIAGNOSIS — E66.01 CLASS 3 SEVERE OBESITY DUE TO EXCESS CALORIES WITH SERIOUS COMORBIDITY AND BODY MASS INDEX (BMI) OF 40.0 TO 44.9 IN ADULT (HCC): ICD-10-CM

## 2019-07-08 DIAGNOSIS — Z11.59 NEED FOR HEPATITIS C SCREENING TEST: ICD-10-CM

## 2019-07-08 DIAGNOSIS — R73.9 HYPERGLYCEMIA: ICD-10-CM

## 2019-07-08 DIAGNOSIS — Z00.00 ANNUAL PHYSICAL EXAM: Primary | ICD-10-CM

## 2019-07-08 DIAGNOSIS — I10 ESSENTIAL HYPERTENSION: ICD-10-CM

## 2019-07-08 DIAGNOSIS — I25.10 ASCVD (ARTERIOSCLEROTIC CARDIOVASCULAR DISEASE): ICD-10-CM

## 2019-07-08 DIAGNOSIS — G47.33 OBSTRUCTIVE SLEEP APNEA SYNDROME: ICD-10-CM

## 2019-07-08 PROCEDURE — 99396 PREV VISIT EST AGE 40-64: CPT | Performed by: FAMILY MEDICINE

## 2019-07-08 NOTE — PATIENT INSTRUCTIONS
Wellness Visit for Adults   AMBULATORY CARE:   A wellness visit  is when you see your healthcare provider to get screened for health problems  You can also get advice on how to stay healthy  Write down your questions so you remember to ask them  Ask your healthcare provider how often you should have a wellness visit  What happens at a wellness visit:  Your healthcare provider will ask about your health, and your family history of health problems  This includes high blood pressure, heart disease, and cancer  He or she will ask if you have symptoms that concern you, if you smoke, and about your mood  You may also be asked about your intake of medicines, supplements, food, and alcohol  Any of the following may be done:  · Your weight  will be checked  Your height may also be checked so your body mass index (BMI) can be calculated  Your BMI shows if you are at a healthy weight  · Your blood pressure  and heart rate will be checked  Your temperature may also be checked  · Blood and urine tests  may be done  Blood tests may be done to check your cholesterol levels  Abnormal cholesterol levels increase your risk for heart disease and stroke  You may also need a blood or urine test to check for diabetes if you are at increased risk  Urine tests may be done to look for signs of an infection or kidney disease  · A physical exam  includes checking your heartbeat and lungs with a stethoscope  Your healthcare provider may also check your skin to look for sun damage  · Screening tests  may be recommended  A screening test is done to check for diseases that may not cause symptoms  The screening tests you may need depend on your age, gender, family history, and lifestyle habits  For example, colorectal screening may be recommended if you are 48years old or older  Screening tests you need if you are a woman:   · A Pap smear  is used to screen for cervical cancer   Pap smears are usually done every 3 to 5 years depending on your age  You may need them more often if you have had abnormal Pap smear test results in the past  Ask your healthcare provider how often you should have a Pap smear  · A mammogram  is an x-ray of your breasts to screen for breast cancer  Experts recommend mammograms every 2 years starting at age 48 years  You may need a mammogram at age 52 years or younger if you have an increased risk for breast cancer  Talk to your healthcare provider about when you should start having mammograms and how often you need them  Vaccines you may need:   · Get an influenza vaccine  every year  The influenza vaccine protects you from the flu  Several types of viruses cause the flu  The viruses change over time, so new vaccines are made each year  · Get a tetanus-diphtheria (Td) booster vaccine  every 10 years  This vaccine protects you against tetanus and diphtheria  Tetanus is a severe infection that may cause painful muscle spasms and lockjaw  Diphtheria is a severe bacterial infection that causes a thick covering in the back of your mouth and throat  · Get a human papillomavirus (HPV) vaccine  if you are female and aged 23 to 32 or male 23 to 24 and never received it  This vaccine protects you from HPV infection  HPV is the most common infection spread by sexual contact  HPV may also cause vaginal, penile, and anal cancers  · Get a pneumococcal vaccine  if you are aged 72 years or older  The pneumococcal vaccine is an injection given to protect you from pneumococcal disease  Pneumococcal disease is an infection caused by pneumococcal bacteria  The infection may cause pneumonia, meningitis, or an ear infection  · Get a shingles vaccine  if you are aged 61 or older, even if you have had shingles before  The shingles vaccine is an injection to protect you from the varicella-zoster virus  This is the same virus that causes chickenpox   Shingles is a painful rash that develops in people who had chickenpox or have been exposed to the virus  How to eat healthy:  My Plate is a model for planning healthy meals  It shows the types and amounts of foods that should go on your plate  Fruits and vegetables make up about half of your plate, and grains and protein make up the other half  A serving of dairy is included on the side of your plate  The amount of calories and serving sizes you need depends on your age, gender, weight, and height  Examples of healthy foods are listed below:  · Eat a variety of vegetables  such as dark green, red, and orange vegetables  You can also include canned vegetables low in sodium (salt) and frozen vegetables without added butter or sauces  · Eat a variety of fresh fruits , canned fruit in 100% juice, frozen fruit, and dried fruit  · Include whole grains  At least half of the grains you eat should be whole grains  Examples include whole-wheat bread, wheat pasta, brown rice, and whole-grain cereals such as oatmeal     · Eat a variety of protein foods such as seafood (fish and shellfish), lean meat, and poultry without skin (turkey and chicken)  Examples of lean meats include pork leg, shoulder, or tenderloin, and beef round, sirloin, tenderloin, and extra lean ground beef  Other protein foods include eggs and egg substitutes, beans, peas, soy products, nuts, and seeds  · Choose low-fat dairy products such as skim or 1% milk or low-fat yogurt, cheese, and cottage cheese  · Limit unhealthy fats  such as butter, hard margarine, and shortening  Exercise:  Exercise at least 30 minutes per day on most days of the week  Some examples of exercise include walking, biking, dancing, and swimming  You can also fit in more physical activity by taking the stairs instead of the elevator or parking farther away from stores  Include muscle strengthening activities 2 days each week  Regular exercise provides many health benefits   It helps you manage your weight, and decreases your risk for type 2 diabetes, heart disease, stroke, and high blood pressure  Exercise can also help improve your mood  Ask your healthcare provider about the best exercise plan for you  General health and safety guidelines:   · Do not smoke  Nicotine and other chemicals in cigarettes and cigars can cause lung damage  Ask your healthcare provider for information if you currently smoke and need help to quit  E-cigarettes or smokeless tobacco still contain nicotine  Talk to your healthcare provider before you use these products  · Limit alcohol  A drink of alcohol is 12 ounces of beer, 5 ounces of wine, or 1½ ounces of liquor  · Lose weight, if needed  Being overweight increases your risk of certain health conditions  These include heart disease, high blood pressure, type 2 diabetes, and certain types of cancer  · Protect your skin  Do not sunbathe or use tanning beds  Use sunscreen with a SPF 15 or higher  Apply sunscreen at least 15 minutes before you go outside  Reapply sunscreen every 2 hours  Wear protective clothing, hats, and sunglasses when you are outside  · Drive safely  Always wear your seatbelt  Make sure everyone in your car wears a seatbelt  A seatbelt can save your life if you are in an accident  Do not use your cell phone when you are driving  This could distract you and cause an accident  Pull over if you need to make a call or send a text message  · Practice safe sex  Use latex condoms if are sexually active and have more than one partner  Your healthcare provider may recommend screening tests for sexually transmitted infections (STIs)  · Wear helmets, lifejackets, and protective gear  Always wear a helmet when you ride a bike or motorcycle, go skiing, or play sports that could cause a head injury  Wear protective equipment when you play sports  Wear a lifejacket when you are on a boat or doing water sports    © 2017 [employer] Information is for End User's use only and may not be sold, redistributed or otherwise used for commercial purposes  All illustrations and images included in CareNotes® are the copyrighted property of [employer]  or [employer]  The above information is an  only  It is not intended as medical advice for individual conditions or treatments  Talk to your doctor, nurse or pharmacist before following any medical regimen to see if it is safe and effective for you  Weight Management   AMBULATORY CARE:   Why it is important to manage your weight:  Being overweight increases your risk of health conditions such as heart disease, high blood pressure, type 2 diabetes, and certain types of cancer  It can also increase your risk for osteoarthritis, sleep apnea, and other respiratory problems  Aim for a slow, steady weight loss  Even a small amount of weight loss can lower your risk of health problems  How to lose weight safely:  A safe and healthy way to lose weight is to eat fewer calories and get regular exercise  You can lose up about 1 pound a week by decreasing the number of calories you eat by 500 calories each day  You can decrease calories by eating smaller portion sizes or by cutting out high-calorie foods  Read labels to find out how many calories are in the foods you eat  You can also burn calories with exercise such as walking, swimming, or biking  You will be more likely to keep weight off if you make these changes part of your lifestyle  Healthy meal plan for weight management:  A healthy meal plan includes a variety of foods, contains fewer calories, and helps you stay healthy  A healthy meal plan includes the following:  · Eat whole-grain foods more often  A healthy meal plan should contain fiber  Fiber is the part of grains, fruits, and vegetables that is not broken down by your body  Whole-grain foods are healthy and provide extra fiber in your diet   Some examples of whole-grain foods are whole-wheat breads and pastas, oatmeal, brown rice, and bulgur  · Eat a variety of vegetables every day  Include dark, leafy greens such as spinach, kale, jerome greens, and mustard greens  Eat yellow and orange vegetables such as carrots, sweet potatoes, and winter squash  · Eat a variety of fruits every day  Choose fresh or canned fruit (canned in its own juice or light syrup) instead of juice  Fruit juice has very little or no fiber  · Eat low-fat dairy foods  Drink fat-free (skim) milk or 1% milk  Eat fat-free yogurt and low-fat cottage cheese  Try low-fat cheeses such as mozzarella and other reduced-fat cheeses  · Choose meat and other protein foods that are low in fat  Choose beans or other legumes such as split peas or lentils  Choose fish, skinless poultry (chicken or turkey), or lean cuts of red meat (beef or pork)  Before you cook meat or poultry, cut off any visible fat  · Use less fat and oil  Try baking foods instead of frying them  Add less fat, such as margarine, sour cream, regular salad dressing and mayonnaise to foods  Eat fewer high-fat foods  Some examples of high-fat foods include french fries, doughnuts, ice cream, and cakes  · Eat fewer sweets  Limit foods and drinks that are high in sugar  This includes candy, cookies, regular soda, and sweetened drinks  Ways to decrease calories:   · Eat smaller portions  ¨ Use a small plate with smaller servings  ¨ Do not eat second helpings  ¨ When you eat at a restaurant, ask for a box and place half of your meal in the box before you eat  ¨ Share an entrée with someone else  · Replace high-calorie snacks with healthy, low-calorie snacks  ¨ Choose fresh fruit, vegetables, fat-free rice cakes, or air-popped popcorn instead of potato chips, nuts, or chocolate  ¨ Choose water or calorie-free drinks instead of soda or sweetened drinks  · Eat regular meals  Skipping meals can lead to overeating later in the day   Eat a healthy snack in place of a meal if you do not have time to eat a regular meal      · Do not shop for groceries when you are hungry  You may be more likely to make unhealthy food choices  Take a grocery list of healthy foods and shop after you have eaten  Exercise:  Exercise at least 30 minutes per day on most days of the week  Some examples of exercise include walking, biking, dancing, and swimming  You can also fit in more physical activity by taking the stairs instead of the elevator or parking farther away from stores  Ask your healthcare provider about the best exercise plan for you  Other things to consider as you try to lose weight:   · Be aware of situations that may give you the urge to overeat, such as eating while watching television  Find ways to avoid these situations  For example, read a book, go for a walk, or do crafts  · Meet with a weight loss support group or friends who are also trying to lose weight  This may help you stay motivated to continue working on your weight loss goals  © 2017 [address] [employer]  Information is for End User's use only and may not be sold, redistributed or otherwise used for commercial purposes  All illustrations and images included in CareNotes® are the copyrighted property of [employer]  or [employer]  The above information is an  only  It is not intended as medical advice for individual conditions or treatments  Talk to your doctor, nurse or pharmacist before following any medical regimen to see if it is safe and effective for you  Cholesterol and Your Health   AMBULATORY CARE:   Cholesterol  is a waxy, fat-like substance  Cholesterol is made by your body, but also comes from certain foods you eat  Your body uses cholesterol to make hormones and new cells  Your body also uses cholesterol to protect nerves  Cholesterol comes from foods such as meat and dairy products   Your total cholesterol level is made up by LDL cholesterol, HDL cholesterol, and triglycerides:  · LDL cholesterol  is called bad cholesterol  because it forms plaque in your arteries  As plaque builds up, your arteries become narrow, and less blood flows through  When plaque decreases blood flow to your heart, you may have chest pain  If plaque completely blocks an artery that bring blood to your heart, you may have a heart attack  Plaque can break off and form blood clots  Blood clots may block arteries in your brain and cause a stroke  · HDL cholesterol  is called good cholesterol  because it helps remove LDL cholesterol from your arteries  It does this by attaching to LDL cholesterol and carrying it to your liver  Your liver breaks down LDL cholesterol so your body can get rid of it  High levels of HDL cholesterol can help prevent a heart attack and stroke  Low levels of HDL cholesterol can increase your risk for heart disease, heart attack, and stroke  · Triglycerides  are a type of fat that store energy from foods you eat  High levels of triglycerides also cause plaque buildup  This can increase your risk for a heart attack or stroke  If your triglyceride level is high, your LDL cholesterol level may also be high  How food affects your cholesterol levels:   · Unhealthy fats  increase LDL cholesterol and triglyceride levels in your blood  They are found in foods high in cholesterol, saturated fat, and trans fat:     ¨ Cholesterol  is found in eggs, dairy, and meat  ¨ Saturated fat  is found in butter, cheese, ice cream, whole milk, and coconut oil  Saturated fat is also found in meat, such as sausage, hot dogs, and bologna  ¨ Trans fat  is found in liquid oils and is used in fried and baked foods  Foods that contain trans fats include chips, crackers, muffins, sweet rolls, microwave popcorn, and cookies  · Healthy fats,  also called unsaturated fats, help lower LDL cholesterol and triglyceride levels   Healthy fats include the following: ¨ Monounsaturated fats  are found in foods such as olive oil, canola oil, avocado, nuts, and olives  ¨ Polyunsaturated fats,  such as omega 3 fats, are found in fish, such as salmon, trout, and tuna  They can also be found in plant foods such as flaxseed, walnuts, and soybeans  Other things that affect your cholesterol levels:   · Smoking cigarettes    · Being overweight or obese     · Drinking large amounts of alcohol    · Not enough exercise or no exercise    · Certain genes passed from your parents to you  What you need to know about having your cholesterol levels checked: Adults 21to 39years of age should have their cholesterol levels checked every 4 to 6 years  Adults 45 years and older should have their cholesterol checked every 1 to 2 years  You may need your cholesterol checked more often, or at a younger age, if you have risk factors for heart disease  You may also need to have your cholesterol checked more often if you have other health conditions, such as diabetes  Blood tests are used to check cholesterol levels  Blood tests measure your levels of triglycerides, LDL cholesterol, and HDL cholesterol  Cholesterol level goals: Your cholesterol level goal may depend on your risk for heart disease  It may also depend on your age and other health conditions  Ask your healthcare provider if the following goals are right for you:  · Your total cholesterol level  should be less than 200 mg/dL  This number may also depend on your HDL and LDL cholesterol goals  · Your LDL cholesterol level  should be less than 130 mg/dL  · Your HDL cholesterol level  should be 60 mg/dL or higher  · Your triglyceride level  should be less than 150 mg/dL  Treatment for high cholesterol:  Treatment for high cholesterol will also decrease your risk of heart disease, heart attack, and stroke   Treatment may include any of the following:  · Medicines  may be given to lower your LDL cholesterol, triglyceride levels, or total cholesterol level  You may need medicines to lower your cholesterol if any of the following is true:     ¨ You have a history of stroke, TIA, unstable angina, or a heart attack    ¨ Your LDL cholesterol level is 190 mg/dL or higher    ¨ You are age 36to 76years of age, have diabetes, and your LDL cholesterol is 70 mg/dL or higher    ¨ You are age 36to 76years of age, have risk factors for heart disease, and your LDL cholesterol is 70 mg/dL or higher    · Lifestyle changes  include changes to your diet, exercise, weight loss, and quitting smoking  It also includes decreasing the amount of alcohol you drink  · Supplements  include fish oil, red yeast rice, and garlic  Fish oil may help lower your triglyceride and LDL cholesterol levels  It may also increase your HDL cholesterol level  Red yeast rice may help decrease your total cholesterol level and LDL cholesterol level  Garlic may help lower your total cholesterol level  Do not take these supplements without talking to your healthcare provider  Nutrition to help lower your cholesterol levels:  A registered dietitian can help you create a healthy eating plan  Read food labels and choose foods low in saturated fat, trans fats, and cholesterol  · Decrease the total amount of fat you eat  Choose lean meats, fat-free or 1% fat milk, and low-fat dairy products, such as yogurt and cheese  Try to limit or avoid red meats  Limit or do not eat fried foods or baked goods such as cookies  · Replace unhealthy fats with healthy fats  Cook foods in olive oil or canola oil  Choose soft margarines that are low in saturated fat and trans fat  Seeds, nuts, and avocados are other examples of healthy fats  · Eat foods with omega-3 fats  Examples include salmon, tuna, mackerel, walnuts, and flaxseed  Eat fish 2 times per week   Children and pregnant women should not eat fish that have high levels of mercury, such as shark, swordfish, and jackie mackerel  · Increase the amount of plant-based foods you eat  Plant-based foods are low in cholesterol and fat  Eating more of these foods may help lower your cholesterol and help you lose weight  Examples of plant-based foods includes fruits, vegetables, legumes, and whole grains  Replace milk that contains dairy with almond, soy, or coconut milk  Eat beans and foods with soy for protein instead of meat  Ask your healthcare provider or dietitian for more information on plant-based foods  · Increase the amount of fiber you eat  High-fiber foods can help lower your LDL cholesterol  You should eat between 20 and 30 grams of fiber each day  Eat at least 5 servings of fruits and vegetables each day  Other examples of high-fiber foods include whole-grain or whole-wheat breads, pastas, or cereals, and brown rice  Eat 3 ounces of whole-grain foods each day  Increase fiber slowly  You may have abdominal discomfort, bloating, and gas if you add fiber to your diet too quickly  Lifestyle changes you can make to help lower your cholesterol levels:   · Maintain a healthy weight  Ask your healthcare provider how much you should weigh  Ask him or her to help you create a weight loss plan if you are overweight  Weight loss can decrease your total cholesterol and triglyceride levels  · Exercise regularly  Exercise can help lower your total cholesterol level and maintain a healthy weight  Exercise can also help increase your HDL cholesterol level  Work with your healthcare provider to create an exercise program that is right for you  Get at least 30 minutes of moderate exercise most days of the week  Examples of exercise include brisk walking, swimming, or biking  · Do not smoke  Nicotine and other chemicals in cigarettes and cigars can damage your lungs, heart, and blood vessels  They can also raise your triglyceride levels   Ask your healthcare provider for information if you currently smoke and need help to quit  E-cigarettes or smokeless tobacco still contain nicotine  Talk to your healthcare provider before you use these products  · Limit or do not drink alcohol  Alcohol can increase your triglyceride levels  Ask your healthcare provider if it is safe for you to drink alcohol  Also ask how much is safe for you to drink each day  © 2017 [address] [employer] Information is for End User's use only and may not be sold, redistributed or otherwise used for commercial purposes  All illustrations and images included in CareNotes® are the copyrighted property of [employer]  or [employer]  The above information is an  only  It is not intended as medical advice for individual conditions or treatments  Talk to your doctor, nurse or pharmacist before following any medical regimen to see if it is safe and effective for you

## 2019-07-16 ENCOUNTER — OFFICE VISIT (OUTPATIENT)
Dept: URGENT CARE | Age: 64
End: 2019-07-16
Payer: COMMERCIAL

## 2019-07-16 VITALS
SYSTOLIC BLOOD PRESSURE: 138 MMHG | TEMPERATURE: 100.4 F | WEIGHT: 213 LBS | DIASTOLIC BLOOD PRESSURE: 73 MMHG | RESPIRATION RATE: 20 BRPM | BODY MASS INDEX: 37.74 KG/M2 | HEART RATE: 94 BPM | OXYGEN SATURATION: 96 % | HEIGHT: 63 IN

## 2019-07-16 DIAGNOSIS — B96.89 ACUTE BACTERIAL BRONCHITIS: Primary | ICD-10-CM

## 2019-07-16 DIAGNOSIS — J20.8 ACUTE BACTERIAL BRONCHITIS: Primary | ICD-10-CM

## 2019-07-16 PROCEDURE — 99213 OFFICE O/P EST LOW 20 MIN: CPT | Performed by: PHYSICIAN ASSISTANT

## 2019-07-16 RX ORDER — ALBUTEROL SULFATE 90 UG/1
2 AEROSOL, METERED RESPIRATORY (INHALATION) EVERY 6 HOURS PRN
Qty: 18 G | Refills: 0 | Status: SHIPPED | OUTPATIENT
Start: 2019-07-16 | End: 2020-01-06

## 2019-07-16 RX ORDER — AZITHROMYCIN 250 MG/1
TABLET, FILM COATED ORAL
Qty: 6 TABLET | Refills: 0 | Status: SHIPPED | OUTPATIENT
Start: 2019-07-16 | End: 2019-07-20

## 2019-07-16 NOTE — PATIENT INSTRUCTIONS
Continue to monitor symptoms  Drink plenty of fluids  Use over the counter Tylenol or Ibuprofen for fever and pain relief  If new or worsening symptoms develop, go immediately to the Er  Follow up with family doctor this week  Acute Bronchitis   WHAT YOU NEED TO KNOW:   Acute bronchitis is swelling and irritation in the air passages of your lungs  This irritation may cause you to cough or have other breathing problems  Acute bronchitis often starts because of another illness, such as a cold or the flu  The illness spreads from your nose and throat to your windpipe and airways  Bronchitis is often called a chest cold  Acute bronchitis lasts about 3 to 6 weeks and is usually not a serious illness  Your cough can last for several weeks  DISCHARGE INSTRUCTIONS:   Return to the emergency department if:   · You cough up blood  · Your lips or fingernails turn blue  · You feel like you are not getting enough air when you breathe  Contact your healthcare provider if:   · You have a fever  · Your breathing problems do not go away or get worse  · Your cough does not get better within 4 weeks  · You have questions or concerns about your condition or care  Self-care:   · Get more rest   Rest helps your body to heal  Slowly start to do more each day  Rest when you feel it is needed  · Avoid irritants in the air  Avoid chemicals, fumes, and dust  Wear a face mask if you must work around dust or fumes  Stay inside on days when air pollution levels are high  If you have allergies, stay inside when pollen counts are high  Do not use aerosol products, such as spray-on deodorant, bug spray, and hair spray  · Do not smoke or be around others who smoke  Nicotine and other chemicals in cigarettes and cigars damages the cilia that move mucus out of your lungs  Ask your healthcare provider for information if you currently smoke and need help to quit  E-cigarettes or smokeless tobacco still contain nicotine  Talk to your healthcare provider before you use these products  · Drink liquids as directed  Liquids help keep your air passages moist and help you cough up mucus  You may need to drink more liquids when you have acute bronchitis  Ask how much liquid to drink each day and which liquids are best for you  · Use a humidifier or vaporizer  Use a cool mist humidifier or a vaporizer to increase air moisture in your home  This may make it easier for you to breathe and help decrease your cough  Decrease risk for acute bronchitis:   · Get the vaccinations you need  Ask your healthcare provider if you should get vaccinated against the flu or pneumonia  · Prevent the spread of germs  You can decrease your risk of acute bronchitis and other illnesses by doing the following:     [facility] your hands often with soap and water  Carry germ-killing hand lotion or gel with you  You can use the lotion or gel to clean your hands when soap and water are not available  ¨ Do not touch your eyes, nose, or mouth unless you have washed your hands first     ¨ Always cover your mouth when you cough to prevent the spread of germs  It is best to cough into a tissue or your shirt sleeve instead of into your hand  Ask those around you cover their mouths when they cough  ¨ Try to avoid people who have a cold or the flu  If you are sick, stay away from others as much as possible  Medicines: Your healthcare provider may  give you any of the following:  · Ibuprofen or acetaminophen  are medicines that help lower your fever  They are available without a doctor's order  Ask your healthcare provider which medicine is right for you  Ask how much to take and how often to take it  Follow directions  These medicines can cause stomach bleeding if not taken correctly  Ibuprofen can cause kidney damage  Do not take ibuprofen if you have kidney disease, an ulcer, or allergies to aspirin  Acetaminophen can cause liver damage   Do not take more than 4,000 milligrams in 24 hours  · Decongestants  help loosen mucus in your lungs and make it easier to cough up  This can help you breathe easier  · Cough suppressants  decrease your urge to cough  If your cough produces mucus, do not take a cough suppressant unless your healthcare provider tells you to  Your healthcare provider may suggest that you take a cough suppressant at night so you can rest     · Inhalers  may be given  Your healthcare provider may give you one or more inhalers to help you breathe easier and cough less  An inhaler gives your medicine to open your airways  Ask your healthcare provider to show you how to use your inhaler correctly  · Take your medicine as directed  Contact your healthcare provider if you think your medicine is not helping or if you have side effects  Tell him of her if you are allergic to any medicine  Keep a list of the medicines, vitamins, and herbs you take  Include the amounts, and when and why you take them  Bring the list or the pill bottles to follow-up visits  Carry your medicine list with you in case of an emergency  Follow up with your healthcare provider as directed:  Write down questions you have so you will remember to ask them during your follow-up visits  © 2017 [employer] Information is for End User's use only and may not be sold, redistributed or otherwise used for commercial purposes  All illustrations and images included in CareNotes® are the copyrighted property of [employer]  or [employer]  The above information is an  only  It is not intended as medical advice for individual conditions or treatments  Talk to your doctor, nurse or pharmacist before following any medical regimen to see if it is safe and effective for you

## 2019-07-16 NOTE — PROGRESS NOTES
[facility]        NAME: Paty Farmer is a 59 y o  female  : 1955    MRN: [id number]  DATE: 2019  TIME: 11:04 AM    Assessment and Plan   Acute bacterial bronchitis [J20 8, B96 89]  1  Acute bacterial bronchitis  azithromycin (ZITHROMAX) 250 mg tablet    albuterol (VENTOLIN HFA) 90 mcg/act inhaler         Patient Instructions       Continue to monitor symptoms  Drink plenty of fluids  Use over the counter Tylenol or Ibuprofen for fever and pain relief  If new or worsening symptoms develop, go immediately to the Er  Follow up with family doctor this week  Chief Complaint     Chief Complaint   Patient presents with    Cold Like Symptoms     Pt c/o feeling hot/cold, body aches, headache, sore throat, congestion, cough, and chest tightness since Friday evening  Pt has been taking Tylenol and Robitussin Dm for symptoms  History of Present Illness       Fever   This is a new problem  Episode onset: 4 days ago  The problem occurs constantly  The problem has been unchanged  Associated symptoms include congestion, coughing, fatigue, a fever and a sore throat  Pertinent negatives include no abdominal pain, chills, diaphoresis, headaches, myalgias, nausea, neck pain, numbness, rash, vomiting or weakness  Nothing aggravates the symptoms  Treatments tried: tylenol, robitussin DM last night  Nothing today  The treatment provided mild relief  Review of Systems   Review of Systems   Constitutional: Positive for fatigue and fever  Negative for chills and diaphoresis  HENT: Positive for congestion, postnasal drip, sinus pressure, sinus pain, sneezing and sore throat  Negative for ear pain, rhinorrhea and voice change  Eyes: Negative  Respiratory: Positive for cough  Negative for chest tightness, shortness of breath and wheezing  Cardiovascular: Negative for palpitations  Gastrointestinal: Negative for abdominal pain, constipation, diarrhea, nausea and vomiting  Endocrine: Negative  Genitourinary: Negative for dysuria  Musculoskeletal: Negative for back pain, myalgias and neck pain  Skin: Negative for pallor and rash  Allergic/Immunologic: Negative  Neurological: Negative for dizziness, syncope, weakness, numbness and headaches  Hematological: Negative  Psychiatric/Behavioral: Negative  Current Medications       Current Outpatient Medications:     albuterol (VENTOLIN HFA) 90 mcg/act inhaler, Inhale 2 puffs every 6 (six) hours as needed for wheezing or shortness of breath, Disp: 18 g, Rfl: 0    aspirin 81 MG tablet, Take by mouth, Disp: , Rfl:     azithromycin (ZITHROMAX) 250 mg tablet, Take 2 tablets today then 1 tablet daily x 4 days, Disp: 6 tablet, Rfl: 0    Calcium Carb-Cholecalciferol (CALTRATE 600+D) 600-800 MG-UNIT TABS, Take by mouth, Disp: , Rfl:     Flaxseed, Linseed, (FLAX SEED OIL) 1300 MG CAPS, Take by mouth, Disp: , Rfl:     metoprolol succinate (TOPROL-XL) 25 mg 24 hr tablet, TAKE 1 TABLET DAILY  , Disp: 90 tablet, Rfl: 3    nitroglycerin (NITROSTAT) 0 4 mg SL tablet, Place 1 tablet (0 4 mg total) under the tongue every 5 (five) minutes as needed (chest pain), Disp: 30 tablet, Rfl: 1    simvastatin (ZOCOR) 20 mg tablet, TAKE 1 TABLET BY MOUTH DAILY, Disp: 90 tablet, Rfl: 3    Current Allergies     Allergies as of 07/16/2019 - Reviewed 07/16/2019   Allergen Reaction Noted    Lisinopril Cough 09/23/2015    Epinephrine Nausea Only and Palpitations 03/19/2013            The following portions of the patient's history were reviewed and updated as appropriate: allergies, current medications, past family history, past medical history, past social history, past surgical history and problem list      Past Medical History:   Diagnosis Date    Avulsion fracture     Last Assessed:10/21/2014    Cellulitis of ankle     Last Assessed:7/18/2014    Chronic fatigue     Last Assessed:12/1/2015    Edema     Last Assessed:7/18/2014    Plantar fasciitis     Last Assessed:9/27/2013    Trigger middle finger of right hand     Last Assessed:2/19/2014       Past Surgical History:   Procedure Laterality Date    BREAST BIOPSY      Incisional    CARDIAC CATHETERIZATION      COLONOSCOPY      7/14    CORONARY ANGIOPLASTY WITH STENT PLACEMENT      CORONARY ANGIOPLASTY WITH STENT PLACEMENT      11/07    ERCP      1988       Family History   Problem Relation Age of Onset    Other Mother         angina pectoris  and septicemia    Diabetes Mother     Cancer Father         laryngeal cancer    Colon cancer Daughter          Medications have been verified  Objective   /73   Pulse 94   Temp 100 4 °F (38 °C)   Resp 20   Ht 5' 3" (1 6 m)   Wt 96 6 kg (213 lb)   SpO2 96%   BMI 37 73 kg/m²        Physical Exam     Physical Exam   Constitutional: She appears well-developed and well-nourished  No distress  HENT:   Head: Normocephalic and atraumatic  Right Ear: Hearing, tympanic membrane, external ear and ear canal normal    Left Ear: Hearing, tympanic membrane, external ear and ear canal normal    Nose: Mucosal edema and rhinorrhea present  Mouth/Throat: No oropharyngeal exudate, posterior oropharyngeal edema or posterior oropharyngeal erythema  Eyes: Conjunctivae are normal  Right eye exhibits no discharge  Left eye exhibits no discharge  Neck: Normal range of motion  Neck supple  Cardiovascular: Normal rate, regular rhythm, normal heart sounds and intact distal pulses  Pulmonary/Chest: Effort normal  No respiratory distress  She has wheezes (L apical)  She has no rales  Lymphadenopathy:     She has no cervical adenopathy  Skin: Skin is warm  Capillary refill takes less than 2 seconds  No rash noted  She is not diaphoretic  Nursing note and vitals reviewed

## 2019-07-16 NOTE — LETTER
July 16, 2019     Patient: Milton Omer   YOB: 1955   Date of Visit: 7/16/2019       To Whom It May Concern: It is my medical opinion that Dimple Garrett may return to work on 7/18/2019  If you have any questions or concerns, please don't hesitate to call           Sincerely,        Enrique Toledo PA-C    CC: No Recipients

## 2019-10-09 ENCOUNTER — HOSPITAL ENCOUNTER (OUTPATIENT)
Dept: RADIOLOGY | Age: 64
Discharge: HOME | End: 2019-10-09
Payer: COMMERCIAL

## 2019-10-09 VITALS — HEIGHT: 63 IN | BODY MASS INDEX: 37.74 KG/M2 | WEIGHT: 213 LBS

## 2019-10-09 DIAGNOSIS — Z12.31 ENCOUNTER FOR SCREENING MAMMOGRAM FOR MALIGNANT NEOPLASM OF BREAST: ICD-10-CM

## 2019-10-09 PROCEDURE — 77067 SCR MAMMO BI INCL CAD: CPT

## 2019-12-17 LAB — HBA1C MFR BLD HPLC: 5.2 %

## 2020-01-06 ENCOUNTER — OFFICE VISIT (OUTPATIENT)
Dept: FAMILY MEDICINE CLINIC | Facility: CLINIC | Age: 65
End: 2020-01-06
Payer: COMMERCIAL

## 2020-01-06 VITALS
WEIGHT: 224 LBS | DIASTOLIC BLOOD PRESSURE: 72 MMHG | OXYGEN SATURATION: 98 % | BODY MASS INDEX: 41.22 KG/M2 | SYSTOLIC BLOOD PRESSURE: 114 MMHG | TEMPERATURE: 97.2 F | HEART RATE: 62 BPM | HEIGHT: 62 IN

## 2020-01-06 DIAGNOSIS — M65.332 TRIGGER MIDDLE FINGER OF LEFT HAND: ICD-10-CM

## 2020-01-06 DIAGNOSIS — R73.9 HYPERGLYCEMIA: ICD-10-CM

## 2020-01-06 DIAGNOSIS — I25.10 ASCVD (ARTERIOSCLEROTIC CARDIOVASCULAR DISEASE): ICD-10-CM

## 2020-01-06 DIAGNOSIS — Z95.5 PRESENCE OF DRUG-ELUTING STENT IN ANTERIOR DESCENDING BRANCH OF LEFT CORONARY ARTERY: ICD-10-CM

## 2020-01-06 DIAGNOSIS — E78.00 HYPERCHOLESTEROLEMIA: ICD-10-CM

## 2020-01-06 DIAGNOSIS — I10 ESSENTIAL HYPERTENSION: Primary | ICD-10-CM

## 2020-01-06 DIAGNOSIS — Z78.0 POST-MENOPAUSAL: ICD-10-CM

## 2020-01-06 DIAGNOSIS — G47.33 OBSTRUCTIVE SLEEP APNEA SYNDROME: ICD-10-CM

## 2020-01-06 DIAGNOSIS — I11.9 HYPERTENSIVE HEART DISEASE WITHOUT CONGESTIVE HEART FAILURE: ICD-10-CM

## 2020-01-06 DIAGNOSIS — E66.01 CLASS 3 SEVERE OBESITY DUE TO EXCESS CALORIES WITH SERIOUS COMORBIDITY AND BODY MASS INDEX (BMI) OF 40.0 TO 44.9 IN ADULT (HCC): ICD-10-CM

## 2020-01-06 PROCEDURE — 3008F BODY MASS INDEX DOCD: CPT | Performed by: FAMILY MEDICINE

## 2020-01-06 PROCEDURE — 3078F DIAST BP <80 MM HG: CPT | Performed by: FAMILY MEDICINE

## 2020-01-06 PROCEDURE — 1036F TOBACCO NON-USER: CPT | Performed by: FAMILY MEDICINE

## 2020-01-06 PROCEDURE — 99214 OFFICE O/P EST MOD 30 MIN: CPT | Performed by: FAMILY MEDICINE

## 2020-01-06 PROCEDURE — 3074F SYST BP LT 130 MM HG: CPT | Performed by: FAMILY MEDICINE

## 2020-01-06 NOTE — PROGRESS NOTES
Assessment/Plan:       Problem List Items Addressed This Visit        Respiratory    Obstructive sleep apnea syndrome     She is stable on CPAP  Cardiovascular and Mediastinum    ASCVD (arteriosclerotic cardiovascular disease)    Relevant Orders    Comprehensive metabolic panel    Lipid Panel with Direct LDL reflex    Essential hypertension - Primary    Relevant Orders    Comprehensive metabolic panel    Lipid Panel with Direct LDL reflex    Hypertensive heart disease without congestive heart failure     She is quite stable since her drug-eluting stent to her LAD in 2007  Relevant Orders    Comprehensive metabolic panel    Lipid Panel with Direct LDL reflex       Musculoskeletal and Integument    Trigger middle finger of left hand       Other    Hypercholesterolemia    Relevant Orders    Comprehensive metabolic panel    Lipid Panel with Direct LDL reflex    Hyperglycemia    Relevant Orders    Hemoglobin A1C    Class 3 severe obesity due to excess calories with serious comorbidity in adult [facility])      Other Visit Diagnoses     Post-menopausal        Relevant Orders    DXA bone density spine hip and pelvis          BMI Counseling: Body mass index is 40 97 kg/m²  The BMI is above normal  Nutrition recommendations include encouraging healthy choices of fruits and vegetables  Exercise recommendations include moderate physical activity 150 minutes/week  Subjective:      Patient ID: Tomas Chavez is a 62 y o  female  HPI The patient presents today for a hypertension follow-up  Patient remains compliant with medications and denies any chest pain, shortness of breath, palpitations, lightheadedness or diaphoresis  She does have a history of heart disease as well  She has history of hyperlipidemia tolerates statin therapy without significant myalgias  She has had a good result from a trigger finger injection done 1 year ago    She has just occasional mild triggering but feels this has been quite helpful  She is obese and has been working on weight loss  She notes she has not really been exercising very much but is planning on trying to increased at this year  Cardiology I placed her on CPAP in April 2018 and she has been tolerating that  She denies excessive fatigue  The following portions of the patient's history were reviewed and updated as appropriate: allergies, current medications, past family history, past medical history, past social history, past surgical history and problem list       Current Outpatient Medications:     aspirin 81 MG tablet, Take by mouth, Disp: , Rfl:     Calcium Carb-Cholecalciferol (CALTRATE 600+D) 600-800 MG-UNIT TABS, Take by mouth, Disp: , Rfl:     Flaxseed, Linseed, (FLAX SEED OIL) 1300 MG CAPS, Take by mouth, Disp: , Rfl:     metoprolol succinate (TOPROL-XL) 25 mg 24 hr tablet, TAKE 1 TABLET DAILY  , Disp: 90 tablet, Rfl: 3    nitroglycerin (NITROSTAT) 0 4 mg SL tablet, Place 1 tablet (0 4 mg total) under the tongue every 5 (five) minutes as needed (chest pain), Disp: 30 tablet, Rfl: 1    simvastatin (ZOCOR) 20 mg tablet, TAKE 1 TABLET BY MOUTH DAILY, Disp: 90 tablet, Rfl: 3     Review of Systems   Constitutional: Negative for appetite change, chills, fatigue, fever and unexpected weight change  HENT: Negative for trouble swallowing  Eyes: Negative for visual disturbance  Respiratory: Negative for cough, chest tightness, shortness of breath and wheezing  Cardiovascular: Negative for chest pain, palpitations and leg swelling  Gastrointestinal: Negative for abdominal distention, abdominal pain, blood in stool, constipation and diarrhea  Endocrine: Negative for polyuria  Genitourinary: Negative for difficulty urinating and flank pain  Musculoskeletal: Negative for arthralgias and myalgias  Skin: Negative for rash  Neurological: Negative for dizziness and light-headedness  Hematological: Negative for adenopathy   Does not bruise/bleed easily  Psychiatric/Behavioral: Negative for sleep disturbance  Objective:      /72 (BP Location: Left arm, Patient Position: Sitting, Cuff Size: Standard)   Pulse 62   Temp (!) 97 2 °F (36 2 °C) (Tympanic)   Ht 5' 2" (1 575 m)   Wt 102 kg (224 lb)   SpO2 98%   BMI 40 97 kg/m²          Physical Exam   Constitutional: She is oriented to person, place, and time  She appears well-developed and well-nourished  No distress  She is obese   HENT:   Head: Normocephalic  Eyes: Pupils are equal, round, and reactive to light  Right eye exhibits no discharge  Left eye exhibits no discharge  Neck: No tracheal deviation present  No thyromegaly present  Cardiovascular: Normal rate, regular rhythm and normal heart sounds  No murmur heard  Pulmonary/Chest: Effort normal  No respiratory distress  She has no wheezes  She has no rales  Abdominal: Soft  She exhibits no distension  There is no tenderness  Musculoskeletal: Normal range of motion  She exhibits no edema  Lymphadenopathy:     She has no cervical adenopathy  Neurological: She is alert and oriented to person, place, and time  No cranial nerve deficit  Skin: Skin is warm  She is not diaphoretic  No erythema  Psychiatric: She has a normal mood and affect  Judgment and thought content normal    Vitals reviewed          Jasmyne Christine MD

## 2020-01-07 PROBLEM — Z95.5 PRESENCE OF DRUG-ELUTING STENT IN ANTERIOR DESCENDING BRANCH OF LEFT CORONARY ARTERY: Status: ACTIVE | Noted: 2020-01-07

## 2020-07-13 ENCOUNTER — HOSPITAL ENCOUNTER (OUTPATIENT)
Dept: RADIOLOGY | Age: 65
Discharge: HOME | End: 2020-07-13
Payer: COMMERCIAL

## 2020-07-13 DIAGNOSIS — Z78.0 POST-MENOPAUSAL: ICD-10-CM

## 2020-07-13 PROCEDURE — 77080 DXA BONE DENSITY AXIAL: CPT

## 2020-07-15 ENCOUNTER — OFFICE VISIT (OUTPATIENT)
Dept: FAMILY MEDICINE CLINIC | Facility: CLINIC | Age: 65
End: 2020-07-15
Payer: COMMERCIAL

## 2020-07-15 VITALS
HEART RATE: 60 BPM | TEMPERATURE: 97.5 F | WEIGHT: 215 LBS | OXYGEN SATURATION: 98 % | DIASTOLIC BLOOD PRESSURE: 80 MMHG | SYSTOLIC BLOOD PRESSURE: 110 MMHG | BODY MASS INDEX: 39.56 KG/M2 | RESPIRATION RATE: 18 BRPM | HEIGHT: 62 IN

## 2020-07-15 DIAGNOSIS — G89.29 CHRONIC PAIN OF LEFT KNEE: ICD-10-CM

## 2020-07-15 DIAGNOSIS — E66.01 CLASS 2 SEVERE OBESITY DUE TO EXCESS CALORIES WITH SERIOUS COMORBIDITY AND BODY MASS INDEX (BMI) OF 39.0 TO 39.9 IN ADULT (HCC): ICD-10-CM

## 2020-07-15 DIAGNOSIS — I11.9 HYPERTENSIVE HEART DISEASE WITHOUT CONGESTIVE HEART FAILURE: ICD-10-CM

## 2020-07-15 DIAGNOSIS — Z11.59 NEED FOR HEPATITIS C SCREENING TEST: ICD-10-CM

## 2020-07-15 DIAGNOSIS — M25.562 CHRONIC PAIN OF LEFT KNEE: ICD-10-CM

## 2020-07-15 DIAGNOSIS — Z00.00 ANNUAL PHYSICAL EXAM: Primary | ICD-10-CM

## 2020-07-15 DIAGNOSIS — I25.10 ASCVD (ARTERIOSCLEROTIC CARDIOVASCULAR DISEASE): ICD-10-CM

## 2020-07-15 DIAGNOSIS — E78.00 HYPERCHOLESTEROLEMIA: ICD-10-CM

## 2020-07-15 DIAGNOSIS — G47.33 OBSTRUCTIVE SLEEP APNEA SYNDROME: ICD-10-CM

## 2020-07-15 DIAGNOSIS — Z11.4 SCREENING FOR HIV (HUMAN IMMUNODEFICIENCY VIRUS): ICD-10-CM

## 2020-07-15 DIAGNOSIS — Z95.5 PRESENCE OF DRUG-ELUTING STENT IN ANTERIOR DESCENDING BRANCH OF LEFT CORONARY ARTERY: ICD-10-CM

## 2020-07-15 DIAGNOSIS — R73.9 HYPERGLYCEMIA: ICD-10-CM

## 2020-07-15 DIAGNOSIS — E66.9 OBESITY, CLASS II, BMI 35-39.9: ICD-10-CM

## 2020-07-15 DIAGNOSIS — I10 ESSENTIAL HYPERTENSION: ICD-10-CM

## 2020-07-15 LAB — HBA1C MFR BLD HPLC: 5.3 %

## 2020-07-15 PROCEDURE — 3008F BODY MASS INDEX DOCD: CPT | Performed by: FAMILY MEDICINE

## 2020-07-15 PROCEDURE — 3079F DIAST BP 80-89 MM HG: CPT | Performed by: FAMILY MEDICINE

## 2020-07-15 PROCEDURE — 99396 PREV VISIT EST AGE 40-64: CPT | Performed by: FAMILY MEDICINE

## 2020-07-15 PROCEDURE — 3074F SYST BP LT 130 MM HG: CPT | Performed by: FAMILY MEDICINE

## 2020-07-15 RX ORDER — METOPROLOL SUCCINATE 25 MG/1
25 TABLET, EXTENDED RELEASE ORAL DAILY
Qty: 90 TABLET | Refills: 3 | Status: SHIPPED | OUTPATIENT
Start: 2020-07-15 | End: 2021-07-02

## 2020-07-15 RX ORDER — SIMVASTATIN 20 MG
20 TABLET ORAL DAILY
Qty: 90 TABLET | Refills: 3 | Status: SHIPPED | OUTPATIENT
Start: 2020-07-15 | End: 2021-02-03 | Stop reason: ALTCHOICE

## 2020-07-15 NOTE — ASSESSMENT & PLAN NOTE
She is having some chronic knee pain  She appears to have a small effusion in the knee  I offered a cortisone injection today, but since her pain is so sporadic, we decided to hold off on that at this time, but I would like her to get an x-ray

## 2020-07-15 NOTE — PATIENT INSTRUCTIONS
Wellness Visit for Adults   AMBULATORY CARE:   A wellness visit  is when you see your healthcare provider to get screened for health problems  You can also get advice on how to stay healthy  Write down your questions so you remember to ask them  Ask your healthcare provider how often you should have a wellness visit  What happens at a wellness visit:  Your healthcare provider will ask about your health, and your family history of health problems  This includes high blood pressure, heart disease, and cancer  He or she will ask if you have symptoms that concern you, if you smoke, and about your mood  You may also be asked about your intake of medicines, supplements, food, and alcohol  Any of the following may be done:  · Your weight  will be checked  Your height may also be checked so your body mass index (BMI) can be calculated  Your BMI shows if you are at a healthy weight  · Your blood pressure  and heart rate will be checked  Your temperature may also be checked  · Blood and urine tests  may be done  Blood tests may be done to check your cholesterol levels  Abnormal cholesterol levels increase your risk for heart disease and stroke  You may also need a blood or urine test to check for diabetes if you are at increased risk  Urine tests may be done to look for signs of an infection or kidney disease  · A physical exam  includes checking your heartbeat and lungs with a stethoscope  Your healthcare provider may also check your skin to look for sun damage  · Screening tests  may be recommended  A screening test is done to check for diseases that may not cause symptoms  The screening tests you may need depend on your age, gender, family history, and lifestyle habits  For example, colorectal screening may be recommended if you are 48years old or older  Screening tests you need if you are a woman:   · A Pap smear  is used to screen for cervical cancer   Pap smears are usually done every 3 to 5 years depending on your age  You may need them more often if you have had abnormal Pap smear test results in the past  Ask your healthcare provider how often you should have a Pap smear  · A mammogram  is an x-ray of your breasts to screen for breast cancer  Experts recommend mammograms every 2 years starting at age 48 years  You may need a mammogram at age 52 years or younger if you have an increased risk for breast cancer  Talk to your healthcare provider about when you should start having mammograms and how often you need them  Vaccines you may need:   · Get an influenza vaccine  every year  The influenza vaccine protects you from the flu  Several types of viruses cause the flu  The viruses change over time, so new vaccines are made each year  · Get a tetanus-diphtheria (Td) booster vaccine  every 10 years  This vaccine protects you against tetanus and diphtheria  Tetanus is a severe infection that may cause painful muscle spasms and lockjaw  Diphtheria is a severe bacterial infection that causes a thick covering in the back of your mouth and throat  · Get a human papillomavirus (HPV) vaccine  if you are female and aged 23 to 32 or male 23 to 24 and never received it  This vaccine protects you from HPV infection  HPV is the most common infection spread by sexual contact  HPV may also cause vaginal, penile, and anal cancers  · Get a pneumococcal vaccine  if you are aged 72 years or older  The pneumococcal vaccine is an injection given to protect you from pneumococcal disease  Pneumococcal disease is an infection caused by pneumococcal bacteria  The infection may cause pneumonia, meningitis, or an ear infection  · Get a shingles vaccine  if you are aged 61 or older, even if you have had shingles before  The shingles vaccine is an injection to protect you from the varicella-zoster virus  This is the same virus that causes chickenpox   Shingles is a painful rash that develops in people who had chickenpox or have been exposed to the virus  How to eat healthy:  My Plate is a model for planning healthy meals  It shows the types and amounts of foods that should go on your plate  Fruits and vegetables make up about half of your plate, and grains and protein make up the other half  A serving of dairy is included on the side of your plate  The amount of calories and serving sizes you need depends on your age, gender, weight, and height  Examples of healthy foods are listed below:  · Eat a variety of vegetables  such as dark green, red, and orange vegetables  You can also include canned vegetables low in sodium (salt) and frozen vegetables without added butter or sauces  · Eat a variety of fresh fruits , canned fruit in 100% juice, frozen fruit, and dried fruit  · Include whole grains  At least half of the grains you eat should be whole grains  Examples include whole-wheat bread, wheat pasta, brown rice, and whole-grain cereals such as oatmeal     · Eat a variety of protein foods such as seafood (fish and shellfish), lean meat, and poultry without skin (turkey and chicken)  Examples of lean meats include pork leg, shoulder, or tenderloin, and beef round, sirloin, tenderloin, and extra lean ground beef  Other protein foods include eggs and egg substitutes, beans, peas, soy products, nuts, and seeds  · Choose low-fat dairy products such as skim or 1% milk or low-fat yogurt, cheese, and cottage cheese  · Limit unhealthy fats  such as butter, hard margarine, and shortening  Exercise:  Exercise at least 30 minutes per day on most days of the week  Some examples of exercise include walking, biking, dancing, and swimming  You can also fit in more physical activity by taking the stairs instead of the elevator or parking farther away from stores  Include muscle strengthening activities 2 days each week  Regular exercise provides many health benefits   It helps you manage your weight, and decreases your risk for type 2 diabetes, heart disease, stroke, and high blood pressure  Exercise can also help improve your mood  Ask your healthcare provider about the best exercise plan for you  General health and safety guidelines:   · Do not smoke  Nicotine and other chemicals in cigarettes and cigars can cause lung damage  Ask your healthcare provider for information if you currently smoke and need help to quit  E-cigarettes or smokeless tobacco still contain nicotine  Talk to your healthcare provider before you use these products  · Limit alcohol  A drink of alcohol is 12 ounces of beer, 5 ounces of wine, or 1½ ounces of liquor  · Lose weight, if needed  Being overweight increases your risk of certain health conditions  These include heart disease, high blood pressure, type 2 diabetes, and certain types of cancer  · Protect your skin  Do not sunbathe or use tanning beds  Use sunscreen with a SPF 15 or higher  Apply sunscreen at least 15 minutes before you go outside  Reapply sunscreen every 2 hours  Wear protective clothing, hats, and sunglasses when you are outside  · Drive safely  Always wear your seatbelt  Make sure everyone in your car wears a seatbelt  A seatbelt can save your life if you are in an accident  Do not use your cell phone when you are driving  This could distract you and cause an accident  Pull over if you need to make a call or send a text message  · Practice safe sex  Use latex condoms if are sexually active and have more than one partner  Your healthcare provider may recommend screening tests for sexually transmitted infections (STIs)  · Wear helmets, lifejackets, and protective gear  Always wear a helmet when you ride a bike or motorcycle, go skiing, or play sports that could cause a head injury  Wear protective equipment when you play sports  Wear a lifejacket when you are on a boat or doing water sports    © 2017 [address] [employer] Information is for End User's use only and may not be sold, redistributed or otherwise used for commercial purposes  All illustrations and images included in CareNotes® are the copyrighted property of [employer] , [employer]  or [employer]  The above information is an  only  It is not intended as medical advice for individual conditions or treatments  Talk to your doctor, nurse or pharmacist before following any medical regimen to see if it is safe and effective for you  Obesity   AMBULATORY CARE:   Obesity  is when your body mass index (BMI) is greater than 30  Your healthcare provider will use your height and weight to measure your BMI  The risks of obesity include  many health problems, such as injuries or physical disability  You may need tests to check for the following:  · Diabetes     · High blood pressure or high cholesterol     · Heart disease     · Gallbladder or liver disease     · Cancer of the colon, breast, prostate, liver, or kidney     · Sleep apnea     · Arthritis or gout  Seek care immediately if:   · You have a severe headache, confusion, or difficulty speaking  · You have weakness on one side of your body  · You have chest pain, sweating, or shortness of breath  Contact your healthcare provider if:   · You have symptoms of gallbladder or liver disease, such as pain in your upper abdomen  · You have knee or hip pain and discomfort while walking  · You have symptoms of diabetes, such as intense hunger and thirst, and frequent urination  · You have symptoms of sleep apnea, such as snoring or daytime sleepiness  · You have questions or concerns about your condition or care  Treatment for obesity  focuses on helping you lose weight to improve your health  Even a small decrease in BMI can reduce the risk for many health problems  Your healthcare provider will help you set a weight-loss goal   · Lifestyle changes  are the first step in treating obesity   These include making healthy food choices and getting regular physical activity  Your healthcare provider may suggest a weight-loss program that involves coaching, education, and therapy  · Medicine  may help you lose weight when it is used with a healthy diet and physical activity  · Surgery  can help you lose weight if you are very obese and have other health problems  There are several types of weight-loss surgery  Ask your healthcare provider for more information  Be successful losing weight:   · Set small, realistic goals  An example of a small goal is to walk for 20 minutes 5 days a week  Anther goal is to lose 5% of your body weight  · Tell friends, family members, and coworkers about your goals  and ask for their support  Ask a friend to lose weight with you, or join a weight-loss support group  · Identify foods or triggers that may cause you to overeat , and find ways to avoid them  Remove tempting high-calorie foods from your home and workplace  Place a bowl of fresh fruit on your kitchen counter  If stress causes you to eat, then find other ways to cope with stress  · Keep a diary to track what you eat and drink  Also write down how many minutes of physical activity you do each day  Weigh yourself once a week and record it in your diary  Eating changes: You will need to eat 500 to 1,000 fewer calories each day than you currently eat to lose 1 to 2 pounds a week  The following changes will help you cut calories:  · Eat smaller portions  Use small plates, no larger than 9 inches in diameter  Fill your plate half full of fruits and vegetables  Measure your food using measuring cups until you know what a serving size looks like  · Eat 3 meals and 1 or 2 snacks each day  Plan your meals in advance  Nelia Laughter and eat at home most of the time  Eat slowly  · Eat fruits and vegetables at every meal   They are low in calories and high in fiber, which makes you feel full   Do not add butter, margarine, or cream sauce to vegetables  Use herbs to season steamed vegetables  · Eat less fat and fewer fried foods  Eat more baked or grilled chicken and fish  These protein sources are lower in calories and fat than red meat  Limit fast food  Dress your salads with olive oil and vinegar instead of bottled dressing  · Limit the amount of sugar you eat  Do not drink sugary beverages  Limit alcohol  Activity changes:  Physical activity is good for your body in many ways  It helps you burn calories and build strong muscles  It decreases stress and depression, and improves your mood  It can also help you sleep better  Talk to your healthcare provider before you begin an exercise program   · Exercise for at least 30 minutes 5 days a week  Start slowly  Set aside time each day for physical activity that you enjoy and that is convenient for you  It is best to do both weight training and an activity that increases your heart rate, such as walking, bicycling, or swimming  · Find ways to be more active  Do yard work and housecleaning  Walk up the stairs instead of using elevators  Spend your leisure time going to events that require walking, such as outdoor festivals or fairs  This extra physical activity can help you lose weight and keep it off  Follow up with your healthcare provider as directed: You may need to meet with a dietitian  Write down your questions so you remember to ask them during your visits  © 2017 [address] [employer] Information is for End User's use only and may not be sold, redistributed or otherwise used for commercial purposes  All illustrations and images included in CareNotes® are the copyrighted property of [employer]  or [employer]  The above information is an  only  It is not intended as medical advice for individual conditions or treatments   Talk to your doctor, nurse or pharmacist before following any medical regimen to see if it is safe and effective for you

## 2020-07-15 NOTE — PROGRESS NOTES
ADULT ANNUAL PHYSICAL  [facility] PRIMARY CARE    NAME: Rony Rosenberg  AGE: 62 y o  SEX: female  : 1955     DATE: 7/15/2020     Assessment and Plan:     Problem List Items Addressed This Visit        Respiratory    Obstructive sleep apnea syndrome     On CPAP         Relevant Orders    CBC and differential       Cardiovascular and Mediastinum    ASCVD (arteriosclerotic cardiovascular disease)    Relevant Medications    metoprolol succinate (TOPROL-XL) 25 mg 24 hr tablet    Other Relevant Orders    CBC and differential    Comprehensive metabolic panel    Lipid Panel with Direct LDL reflex    Essential hypertension    Relevant Medications    metoprolol succinate (TOPROL-XL) 25 mg 24 hr tablet    Other Relevant Orders    Comprehensive metabolic panel    Hypertensive heart disease without congestive heart failure    Relevant Medications    metoprolol succinate (TOPROL-XL) 25 mg 24 hr tablet    Other Relevant Orders    CBC and differential    Comprehensive metabolic panel       Other    Hypercholesterolemia    Relevant Medications    metoprolol succinate (TOPROL-XL) 25 mg 24 hr tablet    simvastatin (ZOCOR) 20 mg tablet    Other Relevant Orders    Comprehensive metabolic panel    Lipid Panel with Direct LDL reflex    Hyperglycemia    Relevant Orders    Comprehensive metabolic panel    Hemoglobin A1C    Class 2 severe obesity due to excess calories with serious comorbidity in [facility])     Continue to work on weight loss  She has lost a few lb during   Continue to remain as active as possible         Presence of drug-eluting stent in anterior descending branch of left coronary artery     Continue cardiology follow-up  Chronic pain of left knee     She is having some chronic knee pain  She appears to have a small effusion in the knee    I offered a cortisone injection today, but since her pain is so sporadic, we decided to hold off on that at this time, but I would like her to get an x-ray  Relevant Orders    XR knee 3 vw left non injury      Other Visit Diagnoses     Annual physical exam    -  Primary    Obesity, Class II, BMI 35-39 9        Need for hepatitis C screening test        Relevant Orders    Hepatitis C antibody    Screening for HIV (human immunodeficiency virus)        Relevant Orders    HIV 1/2 Antigen/Antibody (4th Generation) w Reflex [facility]          Immunizations and preventive care screenings were discussed with patient today  Appropriate education was printed on patient's after visit summary  Counseling:  Alcohol/drug use: discussed moderation in alcohol intake, the recommendations for healthy alcohol use, and avoidance of illicit drug use  Dental Health: discussed importance of regular tooth brushing, flossing, and dental visits  · Exercise: the importance of regular exercise/physical activity was discussed  Recommend exercise 3-5 times per week for at least 30 minutes  BMI Counseling: Body mass index is 39 32 kg/m²  The BMI is above normal  Nutrition recommendations include encouraging healthy choices of fruits and vegetables  Return in 6 months (on 1/15/2021)  Chief Complaint:     Chief Complaint   Patient presents with    Annual Exam      History of Present Illness:     Adult Annual Physical   Patient here for a comprehensive physical exam  The patient reports problems - remain stable  She has history of coronary disease  She remains stable in this sense  She denies any problems chest pain, shortness of breath or palpitations  She is walking for exercise without cardiovascular limitations  She has been working throughout the [employer]  She denies any excessive depression or anxiety  She has a history of hyperlipidemia hyperglycemia  She has been trying to keep her weight down and has lost a few lb  She remains on CPAP for her sleep apnea and denies excessive fatigue    She is having a lot of pain in her left knee over the past year  She has some pain in particular when she bends her knee or when she initiates ambulating  She denies any acute trauma  Trigger finger has remained stable since I injected in December of 2018  Diet and Physical Activity  · Diet/Nutrition: well balanced diet  · Exercise: walking  Depression Screening  PHQ-9 Depression Screening    PHQ-9:    Frequency of the following problems over the past two weeks:       Little interest or pleasure in doing things:  0 - not at all  Feeling down, depressed, or hopeless:  0 - not at all  PHQ-2 Score:  0       General Health  · Sleep: sleeps well  · Hearing: normal - bilateral   · Vision: goes for regular eye exams  · Dental: regular dental visits         /GYN Health  · Patient is: postmenopausal  · Sees gyn - UTD       Review of Systems:     Review of Systems   Past Medical History:     Past Medical History:   Diagnosis Date    Avulsion fracture     Last Assessed:10/21/2014    Cellulitis of ankle     Last Assessed:7/18/2014    Chronic fatigue     Last Assessed:12/1/2015    Edema     Last Assessed:7/18/2014    Plantar fasciitis     Last Assessed:9/27/2013    Trigger middle finger of right hand     Last Assessed:2/19/2014      Past Surgical History:     Past Surgical History:   Procedure Laterality Date    BREAST BIOPSY Right 09/18/2009    ultrasound guided core biopsy-benign    CARDIAC CATHETERIZATION      COLONOSCOPY      7/14    CORONARY ANGIOPLASTY WITH STENT PLACEMENT      CORONARY ANGIOPLASTY WITH STENT PLACEMENT      11/07    ERCP      1988      Social History:        Social History     Socioeconomic History    Marital status: Single     Spouse name: None    Number of children: None    Years of education: None    Highest education level: None   Occupational History    None   Social Needs    Financial resource strain: None    Food insecurity:     Worry: None     Inability: None    Transportation needs:     Medical: None     Non-medical: None   Tobacco Use    Smoking status: Former Smoker     Last attempt to quit: 1994     Years since quittin 5    Smokeless tobacco: Never Used   Substance and Sexual Activity    Alcohol use:  Yes     Alcohol/week: 2 0 - 3 0 standard drinks     Types: 2 - 3 Standard drinks or equivalent per week    Drug use: Never    Sexual activity: Not Currently   Lifestyle    Physical activity:     Days per week: None     Minutes per session: None    Stress: None   Relationships    Social connections:     Talks on phone: None     Gets together: None     Attends Anglican service: None     Active member of club or organization: None     Attends meetings of clubs or organizations: None     Relationship status: None    Intimate partner violence:     Fear of current or ex partner: None     Emotionally abused: None     Physically abused: None     Forced sexual activity: None   Other Topics Concern    None   Social History Narrative    None      Family History:     Family History   Problem Relation Age of Onset    Other Mother         angina pectoris  and septicemia    Diabetes Mother     Angina Mother     Heart disease Mother     Cancer Father         laryngeal cancer    No Known Problems Sister     Breast cancer Maternal Aunt 80    Breast cancer Paternal Aunt 48    No Known Problems Maternal Grandmother     No Known Problems Maternal Grandfather     No Known Problems Paternal Grandmother     No Known Problems Paternal Grandfather     Breast cancer Maternal Aunt 50    Bone cancer Maternal Aunt 76    No Known Problems Brother       Current Medications:     Current Outpatient Medications   Medication Sig Dispense Refill    aspirin 81 MG tablet Take by mouth      Calcium Carb-Cholecalciferol (CALTRATE 600+D) 600-800 MG-UNIT TABS Take by mouth      Flaxseed, Linseed, (FLAX SEED OIL) 1300 MG CAPS Take by mouth      metoprolol succinate (TOPROL-XL) 25 mg 24 hr tablet Take 1 tablet (25 mg total) by mouth daily 90 tablet 3    nitroglycerin (NITROSTAT) 0 4 mg SL tablet Place 1 tablet (0 4 mg total) under the tongue every 5 (five) minutes as needed (chest pain) 30 tablet 1    simvastatin (ZOCOR) 20 mg tablet Take 1 tablet (20 mg total) by mouth daily 90 tablet 3     No current facility-administered medications for this visit  Allergies: Allergies   Allergen Reactions    Lisinopril Cough    Epinephrine Nausea Only and Palpitations     Other reaction(s): Nausea and/or vomiting  Other reaction(s): Palpitations  Other reaction(s): Palpitations      Physical Exam:     /80   Pulse 60   Temp 97 5 °F (36 4 °C)   Resp 18   Ht 5' 2" (1 575 m)   Wt 97 5 kg (215 lb)   SpO2 98%   BMI 39 32 kg/m²     Physical Exam   Constitutional: She is oriented to person, place, and time  She appears well-developed and well-nourished  No distress  HENT:   Head: Normocephalic  Right Ear: External ear normal    Left Ear: External ear normal    Eyes: Pupils are equal, round, and reactive to light  Right eye exhibits no discharge  Left eye exhibits no discharge  Neck: No tracheal deviation present  No thyromegaly present  Cardiovascular: Normal rate, regular rhythm and normal heart sounds  No murmur heard  Pulmonary/Chest: Effort normal  No respiratory distress  She has no wheezes  She has no rales  Abdominal: Soft  She exhibits no distension  There is no tenderness  Musculoskeletal: Normal range of motion  She exhibits tenderness (She has some joint space tenderness of her left knee  She has some pain with full flexion  She has a slight effusion ) and deformity  She exhibits no edema  Lymphadenopathy:     She has no cervical adenopathy  Neurological: She is alert and oriented to person, place, and time  No cranial nerve deficit  Skin: Skin is warm  She is not diaphoretic  No erythema  Psychiatric: She has a normal mood and affect   Judgment and thought content normal        MD Yuki Metcalf [id number]

## 2020-07-15 NOTE — ASSESSMENT & PLAN NOTE
Continue to work on weight loss  She has lost a few lb during [place]    Continue to remain as active as possible

## 2020-07-18 ENCOUNTER — APPOINTMENT (OUTPATIENT)
Dept: RADIOLOGY | Age: 65
End: 2020-07-18
Payer: COMMERCIAL

## 2020-07-18 DIAGNOSIS — G89.29 CHRONIC PAIN OF LEFT KNEE: ICD-10-CM

## 2020-07-18 DIAGNOSIS — M25.562 CHRONIC PAIN OF LEFT KNEE: ICD-10-CM

## 2020-07-18 PROCEDURE — 73562 X-RAY EXAM OF KNEE 3: CPT

## 2020-10-12 ENCOUNTER — TRANSCRIBE ORDERS (OUTPATIENT)
Dept: LAB | Facility: CLINIC | Age: 65
End: 2020-10-12

## 2020-10-14 ENCOUNTER — HOSPITAL ENCOUNTER (OUTPATIENT)
Dept: RADIOLOGY | Age: 65
Discharge: HOME | End: 2020-10-14
Payer: COMMERCIAL

## 2020-10-14 VITALS — BODY MASS INDEX: 39.56 KG/M2 | HEIGHT: 62 IN | WEIGHT: 215 LBS

## 2020-10-14 DIAGNOSIS — Z12.31 ENCOUNTER FOR SCREENING MAMMOGRAM FOR MALIGNANT NEOPLASM OF BREAST: ICD-10-CM

## 2020-10-14 PROCEDURE — 77063 BREAST TOMOSYNTHESIS BI: CPT

## 2020-10-14 PROCEDURE — 77067 SCR MAMMO BI INCL CAD: CPT

## 2021-01-05 LAB
EXTERNAL HIV SCREEN: NORMAL
HBA1C MFR BLD HPLC: 5.2 %
HCV AB SER-ACNC: NEGATIVE

## 2021-02-03 ENCOUNTER — OFFICE VISIT (OUTPATIENT)
Dept: FAMILY MEDICINE CLINIC | Facility: CLINIC | Age: 66
End: 2021-02-03
Payer: COMMERCIAL

## 2021-02-03 VITALS
BODY MASS INDEX: 39.38 KG/M2 | WEIGHT: 214 LBS | TEMPERATURE: 97.4 F | HEART RATE: 64 BPM | OXYGEN SATURATION: 98 % | HEIGHT: 62 IN | SYSTOLIC BLOOD PRESSURE: 132 MMHG | DIASTOLIC BLOOD PRESSURE: 80 MMHG

## 2021-02-03 DIAGNOSIS — G47.33 OBSTRUCTIVE SLEEP APNEA SYNDROME: ICD-10-CM

## 2021-02-03 DIAGNOSIS — R73.9 HYPERGLYCEMIA: ICD-10-CM

## 2021-02-03 DIAGNOSIS — I10 ESSENTIAL HYPERTENSION: ICD-10-CM

## 2021-02-03 DIAGNOSIS — E66.01 CLASS 2 SEVERE OBESITY DUE TO EXCESS CALORIES WITH SERIOUS COMORBIDITY AND BODY MASS INDEX (BMI) OF 39.0 TO 39.9 IN ADULT (HCC): ICD-10-CM

## 2021-02-03 DIAGNOSIS — I25.10 ASCVD (ARTERIOSCLEROTIC CARDIOVASCULAR DISEASE): Primary | ICD-10-CM

## 2021-02-03 DIAGNOSIS — Z95.5 PRESENCE OF DRUG-ELUTING STENT IN ANTERIOR DESCENDING BRANCH OF LEFT CORONARY ARTERY: ICD-10-CM

## 2021-02-03 DIAGNOSIS — E78.00 HYPERCHOLESTEROLEMIA: ICD-10-CM

## 2021-02-03 DIAGNOSIS — I11.9 HYPERTENSIVE HEART DISEASE WITHOUT CONGESTIVE HEART FAILURE: ICD-10-CM

## 2021-02-03 PROCEDURE — 1036F TOBACCO NON-USER: CPT | Performed by: FAMILY MEDICINE

## 2021-02-03 PROCEDURE — 3008F BODY MASS INDEX DOCD: CPT | Performed by: FAMILY MEDICINE

## 2021-02-03 PROCEDURE — 99214 OFFICE O/P EST MOD 30 MIN: CPT | Performed by: FAMILY MEDICINE

## 2021-02-03 RX ORDER — ROSUVASTATIN CALCIUM 20 MG/1
20 TABLET, COATED ORAL DAILY
Qty: 90 TABLET | Refills: 3 | Status: SHIPPED | OUTPATIENT
Start: 2021-02-03 | End: 2022-01-23

## 2021-02-03 NOTE — ASSESSMENT & PLAN NOTE
Her LDL did bump up a bit  I am going to take this opportunity to switch her over to a more effective statin in the form of Crestor 20 mg daily  Repeat labs within 6 months

## 2021-02-03 NOTE — PROGRESS NOTES
Assessment/Plan:       Problem List Items Addressed This Visit        Respiratory    Obstructive sleep apnea syndrome      On CPAP            Cardiovascular and Mediastinum    ASCVD (arteriosclerotic cardiovascular disease) - Primary    Relevant Orders    CBC and differential    Comprehensive metabolic panel    Lipid Panel with Direct LDL reflex    Essential hypertension    Relevant Orders    CBC and differential    Comprehensive metabolic panel    Lipid Panel with Direct LDL reflex    Hypertensive heart disease without congestive heart failure    Relevant Orders    CBC and differential    Comprehensive metabolic panel    Lipid Panel with Direct LDL reflex       Other    Hypercholesterolemia     Her LDL did bump up a bit  I am going to take this opportunity to switch her over to a more effective statin in the form of Crestor 20 mg daily  Repeat labs within 6 months  Relevant Medications    rosuvastatin (CRESTOR) 20 MG tablet    Other Relevant Orders    Comprehensive metabolic panel    Lipid Panel with Direct LDL reflex    Hyperglycemia    Relevant Orders    Hemoglobin A1C    Class 2 severe obesity due to excess calories with serious comorbidity in [facility])     She will continue work on weight loss  Relevant Orders    Lipid Panel with Direct LDL reflex    Hemoglobin A1C    Presence of drug-eluting stent in anterior descending branch of left coronary artery          BMI Counseling: Body mass index is 39 14 kg/m²  The BMI is above normal  Nutrition recommendations include encouraging healthy choices of fruits and vegetables  Exercise recommendations include moderate physical activity 150 minutes/week  Subjective:      Patient ID: Saul Clinton is a 60 y o  female  HPI   Patient presents today for follow-up for chronic health issues  She has history of coronary artery disease    She has been active including significant shoveling yesterday and denies any cardiovascular limitations such as shortness of breath or chest pain  She has history of hyperglycemia but denies polyuria or polydipsia  She has history of hyperlipidemia  LDL did bump up to 80  She notes she has been eating a lot more eggs  She does remain on simvastatin  She has history of hypertension  She is tolerating metoprolol without lightheadedness at this time  The following portions of the patient's history were reviewed and updated as appropriate: allergies, current medications, past family history, past medical history, past social history, past surgical history and problem list       Current Outpatient Medications:     aspirin 81 MG tablet, Take by mouth, Disp: , Rfl:     Calcium Carb-Cholecalciferol (CALTRATE 600+D) 600-800 MG-UNIT TABS, Take by mouth, Disp: , Rfl:     Flaxseed, Linseed, (FLAX SEED OIL) 1300 MG CAPS, Take by mouth, Disp: , Rfl:     metoprolol succinate (TOPROL-XL) 25 mg 24 hr tablet, Take 1 tablet (25 mg total) by mouth daily, Disp: 90 tablet, Rfl: 3    nitroglycerin (NITROSTAT) 0 4 mg SL tablet, Place 1 tablet (0 4 mg total) under the tongue every 5 (five) minutes as needed (chest pain) (Patient not taking: Reported on 2/3/2021), Disp: 30 tablet, Rfl: 1    rosuvastatin (CRESTOR) 20 MG tablet, Take 1 tablet (20 mg total) by mouth daily, Disp: 90 tablet, Rfl: 3     Review of Systems   Constitutional: Negative for appetite change, chills, fatigue, fever and unexpected weight change  HENT: Negative for trouble swallowing  Eyes: Negative for visual disturbance  Respiratory: Negative for cough, chest tightness, shortness of breath and wheezing  Cardiovascular: Negative for chest pain, palpitations and leg swelling  Gastrointestinal: Negative for abdominal distention, abdominal pain, blood in stool, constipation and diarrhea  Endocrine: Negative for polyuria  Genitourinary: Negative for difficulty urinating and flank pain  Musculoskeletal: Negative for arthralgias and myalgias     Skin: Negative for rash  Neurological: Negative for dizziness and light-headedness  Hematological: Negative for adenopathy  Does not bruise/bleed easily  Psychiatric/Behavioral: Negative for dysphoric mood and sleep disturbance  The patient is not nervous/anxious  Objective:      /80 (BP Location: Left arm, Patient Position: Sitting, Cuff Size: Standard)   Pulse 64   Temp (!) 97 4 °F (36 3 °C)   Ht 5' 2" (1 575 m)   Wt 97 1 kg (214 lb)   SpO2 98%   BMI 39 14 kg/m²          Physical Exam  Vitals signs reviewed  Constitutional:       General: She is not in acute distress  Appearance: She is well-developed  She is obese  She is not diaphoretic  HENT:      Head: Normocephalic  Eyes:      General:         Right eye: No discharge  Left eye: No discharge  Pupils: Pupils are equal, round, and reactive to light  Neck:      Thyroid: No thyromegaly  Trachea: No tracheal deviation  Cardiovascular:      Rate and Rhythm: Normal rate and regular rhythm  Heart sounds: Normal heart sounds  No murmur  Pulmonary:      Effort: Pulmonary effort is normal  No respiratory distress  Breath sounds: No wheezing or rales  Abdominal:      General: There is no distension  Palpations: Abdomen is soft  Tenderness: There is no abdominal tenderness  Musculoskeletal: Normal range of motion  Lymphadenopathy:      Cervical: No cervical adenopathy  Skin:     General: Skin is warm  Findings: No erythema  Neurological:      Mental Status: She is alert and oriented to person, place, and time  Cranial Nerves: No cranial nerve deficit  Psychiatric:         Thought Content:  Thought content normal          Judgment: Judgment normal            Holger Booth MD

## 2021-03-10 DIAGNOSIS — Z23 ENCOUNTER FOR IMMUNIZATION: ICD-10-CM

## 2021-04-06 ENCOUNTER — TELEPHONE (OUTPATIENT)
Dept: FAMILY MEDICINE CLINIC | Facility: CLINIC | Age: 66
End: 2021-04-06

## 2021-04-06 NOTE — TELEPHONE ENCOUNTER
The patient was called and stated that they were already vaccinated for Covid-19  The Covid-19 Health Maintenance Topic was postponed for 6 months so the patient no longer shows overdue  The postponed Covid-19 Health Maintenance topic will still be able to be satisfied by external vaccination data, when it is received, or when the historical vaccinations are documented during the next PCP office visit

## 2021-07-01 DIAGNOSIS — I25.10 ASCVD (ARTERIOSCLEROTIC CARDIOVASCULAR DISEASE): ICD-10-CM

## 2021-07-01 DIAGNOSIS — E78.00 HYPERCHOLESTEROLEMIA: ICD-10-CM

## 2021-07-02 RX ORDER — METOPROLOL SUCCINATE 25 MG/1
TABLET, EXTENDED RELEASE ORAL
Qty: 90 TABLET | Refills: 3 | Status: SHIPPED | OUTPATIENT
Start: 2021-07-02 | End: 2022-06-24

## 2021-08-20 ENCOUNTER — RA CDI HCC (OUTPATIENT)
Dept: OTHER | Facility: HOSPITAL | Age: 66
End: 2021-08-20

## 2021-08-20 NOTE — PROGRESS NOTES
[facility]  coding opportunities       Chart reviewed, no opportunity found: CHART REVIEWED, NO OPPORTUNITY FOUND                        Patients insurance company: [employer] (Medicare Advantage and Commercial)

## 2021-08-27 ENCOUNTER — OFFICE VISIT (OUTPATIENT)
Dept: FAMILY MEDICINE CLINIC | Facility: CLINIC | Age: 66
End: 2021-08-27
Payer: COMMERCIAL

## 2021-08-27 VITALS
DIASTOLIC BLOOD PRESSURE: 78 MMHG | WEIGHT: 221 LBS | TEMPERATURE: 97.5 F | BODY MASS INDEX: 40.67 KG/M2 | HEART RATE: 61 BPM | SYSTOLIC BLOOD PRESSURE: 128 MMHG | HEIGHT: 62 IN | OXYGEN SATURATION: 99 %

## 2021-08-27 DIAGNOSIS — I10 ESSENTIAL HYPERTENSION: ICD-10-CM

## 2021-08-27 DIAGNOSIS — E66.01 CLASS 2 SEVERE OBESITY DUE TO EXCESS CALORIES WITH SERIOUS COMORBIDITY AND BODY MASS INDEX (BMI) OF 39.0 TO 39.9 IN ADULT (HCC): ICD-10-CM

## 2021-08-27 DIAGNOSIS — G47.33 OBSTRUCTIVE SLEEP APNEA SYNDROME: ICD-10-CM

## 2021-08-27 DIAGNOSIS — M17.12 PRIMARY OSTEOARTHRITIS OF LEFT KNEE: ICD-10-CM

## 2021-08-27 DIAGNOSIS — I25.10 ASCVD (ARTERIOSCLEROTIC CARDIOVASCULAR DISEASE): ICD-10-CM

## 2021-08-27 DIAGNOSIS — Z95.5 PRESENCE OF DRUG-ELUTING STENT IN ANTERIOR DESCENDING BRANCH OF LEFT CORONARY ARTERY: ICD-10-CM

## 2021-08-27 DIAGNOSIS — E78.00 HYPERCHOLESTEROLEMIA: ICD-10-CM

## 2021-08-27 DIAGNOSIS — R73.9 HYPERGLYCEMIA: ICD-10-CM

## 2021-08-27 DIAGNOSIS — I11.9 HYPERTENSIVE HEART DISEASE WITHOUT CONGESTIVE HEART FAILURE: Primary | ICD-10-CM

## 2021-08-27 PROCEDURE — 99214 OFFICE O/P EST MOD 30 MIN: CPT | Performed by: FAMILY MEDICINE

## 2021-08-27 PROCEDURE — 3008F BODY MASS INDEX DOCD: CPT | Performed by: FAMILY MEDICINE

## 2021-08-27 PROCEDURE — 1036F TOBACCO NON-USER: CPT | Performed by: FAMILY MEDICINE

## 2021-08-27 PROCEDURE — 3725F SCREEN DEPRESSION PERFORMED: CPT | Performed by: FAMILY MEDICINE

## 2021-08-27 NOTE — ASSESSMENT & PLAN NOTE
She is having some persistent pain but is tolerating this without major difficulties  I offered orthopedic consultation at this time but she declines

## 2021-08-27 NOTE — PROGRESS NOTES
Assessment/Plan:       Problem List Items Addressed This Visit        Respiratory    Obstructive sleep apnea syndrome     Stable on CPAP  Cardiovascular and Mediastinum    ASCVD (arteriosclerotic cardiovascular disease)    Relevant Orders    Lipid Panel with Direct LDL reflex    Essential hypertension    Relevant Orders    CBC and differential    Comprehensive metabolic panel    Lipid Panel with Direct LDL reflex    Hypertensive heart disease without congestive heart failure - Primary     Stable at this time  Relevant Orders    CBC and differential    Comprehensive metabolic panel    Lipid Panel with Direct LDL reflex    TSH, 3rd generation with Free T4 reflex       Musculoskeletal and Integument    Osteoarthritis of left knee       Other    Hypercholesterolemia    Relevant Orders    Comprehensive metabolic panel    Lipid Panel with Direct LDL reflex    Hyperglycemia    Relevant Orders    Hemoglobin A1C    Class 2 severe obesity due to excess calories with serious comorbidity in adult [facility])    Presence of drug-eluting stent in anterior descending branch of left coronary artery            Subjective:      Patient ID: Emanuel Olivera is a 60 y o  female  HPI patient presents today for follow-up for chronic health issues  She is having some pain from her osteoarthritis of her left knee  She is tolerating this and denies any swelling  She has history of coronary disease which is stable on blood pressures have remained stable      The following portions of the patient's history were reviewed and updated as appropriate: allergies, current medications, past family history, past medical history, past social history, past surgical history and problem list       Current Outpatient Medications:     aspirin 81 MG tablet, Take by mouth, Disp: , Rfl:     Calcium Carb-Cholecalciferol (CALTRATE 600+D) 600-800 MG-UNIT TABS, Take by mouth, Disp: , Rfl:     Flaxseed, Linseed, (FLAX SEED OIL) 1300 MG CAPS, Take by mouth, Disp: , Rfl:     metoprolol succinate (TOPROL-XL) 25 mg 24 hr tablet, TAKE 1 TABLET BY MOUTH EVERY DAY, Disp: 90 tablet, Rfl: 3    nitroglycerin (NITROSTAT) 0 4 mg SL tablet, Place 1 tablet (0 4 mg total) under the tongue every 5 (five) minutes as needed (chest pain), Disp: 30 tablet, Rfl: 1    rosuvastatin (CRESTOR) 20 MG tablet, Take 1 tablet (20 mg total) by mouth daily, Disp: 90 tablet, Rfl: 3     Review of Systems   Constitutional: Negative for chills, fatigue, fever and unexpected weight change  HENT: Negative for trouble swallowing  Eyes: Negative for visual disturbance  Respiratory: Negative for cough, chest tightness, shortness of breath and wheezing  Cardiovascular: Negative for chest pain, palpitations and leg swelling  Gastrointestinal: Negative for abdominal distention, abdominal pain, blood in stool, constipation and diarrhea  Endocrine: Negative for polyuria  Genitourinary: Negative for difficulty urinating and flank pain  Musculoskeletal: Negative for arthralgias and myalgias  Skin: Negative for rash  Neurological: Negative for dizziness and light-headedness  Hematological: Negative for adenopathy  Does not bruise/bleed easily  Psychiatric/Behavioral: Negative for dysphoric mood and sleep disturbance  The patient is not nervous/anxious  Objective:      /78 (BP Location: Right arm, Patient Position: Sitting, Cuff Size: Standard)   Pulse 61   Temp 97 5 °F (36 4 °C)   Ht 5' 2" (1 575 m)   Wt 100 kg (221 lb)   SpO2 99%   BMI 40 42 kg/m²          Physical Exam  Constitutional:       General: She is not in acute distress  Appearance: She is well-developed  She is obese  She is not diaphoretic  HENT:      Head: Normocephalic  Eyes:      General:         Right eye: No discharge  Left eye: No discharge  Pupils: Pupils are equal, round, and reactive to light  Neck:      Thyroid: No thyromegaly        Trachea: No tracheal deviation  Cardiovascular:      Rate and Rhythm: Normal rate and regular rhythm  Heart sounds: Normal heart sounds  No murmur heard  Pulmonary:      Effort: Pulmonary effort is normal  No respiratory distress  Breath sounds: No wheezing or rales  Abdominal:      General: There is no distension  Palpations: Abdomen is soft  Tenderness: There is no abdominal tenderness  Musculoskeletal:         General: Normal range of motion  Lymphadenopathy:      Cervical: No cervical adenopathy  Skin:     General: Skin is warm  Findings: No erythema  Neurological:      Mental Status: She is alert and oriented to person, place, and time  Cranial Nerves: No cranial nerve deficit  Psychiatric:         Thought Content:  Thought content normal          Judgment: Judgment normal            Rhoda Russell MD

## 2021-11-03 ENCOUNTER — HOSPITAL ENCOUNTER (OUTPATIENT)
Dept: RADIOLOGY | Age: 66
Discharge: HOME | End: 2021-11-03
Payer: COMMERCIAL

## 2021-11-03 VITALS — HEIGHT: 62 IN | WEIGHT: 221 LBS | BODY MASS INDEX: 40.67 KG/M2

## 2021-11-03 DIAGNOSIS — Z12.31 ENCOUNTER FOR SCREENING MAMMOGRAM FOR MALIGNANT NEOPLASM OF BREAST: ICD-10-CM

## 2021-11-03 PROCEDURE — 77063 BREAST TOMOSYNTHESIS BI: CPT

## 2021-11-03 PROCEDURE — 77067 SCR MAMMO BI INCL CAD: CPT

## 2021-12-02 ENCOUNTER — VBI (OUTPATIENT)
Dept: ADMINISTRATIVE | Facility: OTHER | Age: 66
End: 2021-12-02

## 2022-01-23 DIAGNOSIS — E78.00 HYPERCHOLESTEROLEMIA: ICD-10-CM

## 2022-01-23 RX ORDER — ROSUVASTATIN CALCIUM 20 MG/1
TABLET, COATED ORAL
Qty: 90 TABLET | Refills: 3 | Status: SHIPPED | OUTPATIENT
Start: 2022-01-23

## 2022-02-24 ENCOUNTER — RA CDI HCC (OUTPATIENT)
Dept: OTHER | Facility: HOSPITAL | Age: 67
End: 2022-02-24

## 2022-02-24 NOTE — PROGRESS NOTES
The following dx found on active problem list - please assess using MEAT for 2022 billing    Class 2 severe obesity due to excess calories with serious comorbidity in adult ([facility] ) [E66 01]    [facility] 75  coding opportunities             Chart Reviewed * (Number of) Inbasket suggestions sent to Provider: 1                  Patients insurance company: [employer] ([employer])

## 2022-03-02 ENCOUNTER — OFFICE VISIT (OUTPATIENT)
Dept: FAMILY MEDICINE CLINIC | Facility: CLINIC | Age: 67
End: 2022-03-02
Payer: COMMERCIAL

## 2022-03-02 VITALS
OXYGEN SATURATION: 98 % | DIASTOLIC BLOOD PRESSURE: 80 MMHG | HEIGHT: 62 IN | SYSTOLIC BLOOD PRESSURE: 122 MMHG | RESPIRATION RATE: 18 BRPM | HEART RATE: 64 BPM | BODY MASS INDEX: 42.33 KG/M2 | WEIGHT: 230 LBS

## 2022-03-02 DIAGNOSIS — I10 ESSENTIAL HYPERTENSION: ICD-10-CM

## 2022-03-02 DIAGNOSIS — Z23 NEED FOR TDAP VACCINATION: ICD-10-CM

## 2022-03-02 DIAGNOSIS — I11.9 HYPERTENSIVE HEART DISEASE WITHOUT CONGESTIVE HEART FAILURE: ICD-10-CM

## 2022-03-02 DIAGNOSIS — G47.33 OBSTRUCTIVE SLEEP APNEA SYNDROME: ICD-10-CM

## 2022-03-02 DIAGNOSIS — E78.00 HYPERCHOLESTEROLEMIA: ICD-10-CM

## 2022-03-02 DIAGNOSIS — Z95.5 PRESENCE OF DRUG-ELUTING STENT IN ANTERIOR DESCENDING BRANCH OF LEFT CORONARY ARTERY: ICD-10-CM

## 2022-03-02 DIAGNOSIS — I25.10 ASCVD (ARTERIOSCLEROTIC CARDIOVASCULAR DISEASE): ICD-10-CM

## 2022-03-02 DIAGNOSIS — E66.01 CLASS 3 SEVERE OBESITY DUE TO EXCESS CALORIES WITH SERIOUS COMORBIDITY AND BODY MASS INDEX (BMI) OF 40.0 TO 44.9 IN ADULT (HCC): ICD-10-CM

## 2022-03-02 DIAGNOSIS — Z00.00 ANNUAL PHYSICAL EXAM: Primary | ICD-10-CM

## 2022-03-02 DIAGNOSIS — R73.9 HYPERGLYCEMIA: ICD-10-CM

## 2022-03-02 PROCEDURE — 1160F RVW MEDS BY RX/DR IN RCRD: CPT | Performed by: FAMILY MEDICINE

## 2022-03-02 PROCEDURE — 3008F BODY MASS INDEX DOCD: CPT | Performed by: FAMILY MEDICINE

## 2022-03-02 PROCEDURE — 90471 IMMUNIZATION ADMIN: CPT

## 2022-03-02 PROCEDURE — 3288F FALL RISK ASSESSMENT DOCD: CPT | Performed by: FAMILY MEDICINE

## 2022-03-02 PROCEDURE — 3725F SCREEN DEPRESSION PERFORMED: CPT | Performed by: FAMILY MEDICINE

## 2022-03-02 PROCEDURE — 1036F TOBACCO NON-USER: CPT | Performed by: FAMILY MEDICINE

## 2022-03-02 PROCEDURE — 99397 PER PM REEVAL EST PAT 65+ YR: CPT | Performed by: FAMILY MEDICINE

## 2022-03-02 PROCEDURE — 90715 TDAP VACCINE 7 YRS/> IM: CPT

## 2022-03-02 PROCEDURE — 1101F PT FALLS ASSESS-DOCD LE1/YR: CPT | Performed by: FAMILY MEDICINE

## 2022-03-02 NOTE — PROGRESS NOTES
ADULT ANNUAL [facility]    NAME: Alvin Tian  AGE: 73 y o  SEX: female  : 1955     DATE: 3/2/2022     Assessment and Plan:     Problem List Items Addressed This Visit        Respiratory    Obstructive sleep apnea syndrome    Relevant Orders    CBC and differential       Cardiovascular and Mediastinum    ASCVD (arteriosclerotic cardiovascular disease)    Essential hypertension    Relevant Orders    CBC and differential    Comprehensive metabolic panel    Hypertensive heart disease without congestive heart failure    Relevant Orders    CBC and differential    Comprehensive metabolic panel    Lipid Panel with Direct LDL reflex       Other    Hypercholesterolemia    Relevant Orders    Comprehensive metabolic panel    Lipid Panel with Direct LDL reflex    Hyperglycemia     Check A1c prior to follow-up  Fortunately labs have been good lately  Relevant Orders    Hemoglobin A1C    Class 3 severe obesity due to excess calories with serious comorbidity and body mass index (BMI) of 40 0 to 44 9 in [facility])     She is going to increase her exercise over the next few months  Follow-up in about 6 months  Presence of drug-eluting stent in anterior descending branch of left coronary artery    Relevant Orders    Lipid Panel with Direct LDL reflex      Other Visit Diagnoses     Annual physical exam    -  Primary    Tetanus shot will be given today  She will get a Pneumovax 20 at her next visit as we do not have this yet    Need for Tdap vaccination        Relevant Orders    TDAP VACCINE GREATER THAN OR EQUAL TO 8YO IM (Completed)          Immunizations and preventive care screenings were discussed with patient today  Appropriate education was printed on patient's after visit summary      Counseling:  Alcohol/drug use: discussed moderation in alcohol intake, the recommendations for healthy alcohol use, and avoidance of illicit drug use  Dental Health: discussed importance of regular tooth brushing, flossing, and dental visits  · Exercise: the importance of regular exercise/physical activity was discussed  Recommend exercise 3-5 times per week for at least 30 minutes  Return in about 6 months (around 9/2/2022)  Chief Complaint:     Chief Complaint   Patient presents with    Annual Exam      History of Present Illness:     Adult Annual Physical   Patient here for a comprehensive physical exam  The patient reports That she is doing well  She has a history of coronary disease but is doing very well  She continues to follow with Cardiology  She has gained a few pounds but is planning on increasing her exercise over the next few months  Fortunately, she is having no cardiovascular limitations to exertion  Diet and Physical Activity  · Diet/Nutrition: poor diet  · Exercise: no formal exercise  Depression Screening  PHQ-2/9 Depression Screening    Little interest or pleasure in doing things: 0 - not at all  Feeling down, depressed, or hopeless: 0 - not at all  PHQ-2 Score: 0  PHQ-2 Interpretation: Negative depression screen       General Health  · Sleep: sleeps well  · Hearing: normal - bilateral   · Vision: goes for regular eye exams  · Dental: regular dental visits  /GYN Health  · Follows with gyn     Review of Systems:     Review of Systems   Constitutional: Negative for appetite change, chills, fatigue, fever and unexpected weight change  HENT: Negative for trouble swallowing  Eyes: Negative for visual disturbance  Respiratory: Negative for cough, chest tightness, shortness of breath and wheezing  Cardiovascular: Negative for chest pain, palpitations and leg swelling  Gastrointestinal: Negative for abdominal distention, abdominal pain, blood in stool, constipation and diarrhea  Endocrine: Negative for polyuria  Genitourinary: Negative for difficulty urinating and flank pain  Musculoskeletal: Negative for arthralgias and myalgias  Skin: Negative for rash  Neurological: Negative for dizziness and light-headedness  Hematological: Negative for adenopathy  Does not bruise/bleed easily  Psychiatric/Behavioral: Negative for dysphoric mood and sleep disturbance  The patient is not nervous/anxious  Past Medical History:     Past Medical History:   Diagnosis Date    Avulsion fracture     Last Assessed:10/21/2014    Cellulitis of ankle     Last Assessed:2014    Chronic fatigue     Last Assessed:2015    Edema     Last Assessed:2014    Plantar fasciitis     Last Assessed:2013    Trigger middle finger of right hand     Last Assessed:2014      Past Surgical History:     Past Surgical History:   Procedure Laterality Date    BREAST BIOPSY Right 2009    ultrasound guided core biopsy-benign    CARDIAC CATHETERIZATION      COLONOSCOPY          CORONARY ANGIOPLASTY WITH STENT PLACEMENT      CORONARY ANGIOPLASTY WITH STENT PLACEMENT          [facility]      1988      Social History:     Social History     Socioeconomic History    Marital status: Single     Spouse name: None    Number of children: None    Years of education: None    Highest education level: None   Occupational History    None   Tobacco Use    Smoking status: Former Smoker     Quit date: 1994     Years since quittin 1    Smokeless tobacco: Never Used   Substance and Sexual Activity    Alcohol use:  Yes     Alcohol/week: 2 0 - 3 0 standard drinks     Types: 2 - 3 Standard drinks or equivalent per week    Drug use: Never    Sexual activity: Not Currently   Other Topics Concern    None   Social History Narrative    None     Social Determinants of Health     Financial Resource Strain: Not on file   Food Insecurity: Not on file   Transportation Needs: Not on file   Physical Activity: Not on file   Stress: Not on file   Social Connections: Not on file   Intimate Partner Violence: Not on file   Housing Stability: Not on file      Family History:     Family History   Problem Relation Age of Onset    Other Mother         angina pectoris  and septicemia    Diabetes Mother     Angina Mother     Heart disease Mother     Cancer Father         laryngeal cancer    No Known Problems Sister     Breast cancer Maternal Aunt 80    Breast cancer Paternal Aunt 48    No Known Problems Maternal Grandmother     No Known Problems Maternal Grandfather     No Known Problems Paternal Grandmother     No Known Problems Paternal Grandfather     Breast cancer Maternal Aunt 50    Bone cancer Maternal Aunt 76    No Known Problems Brother       Current Medications:     Current Outpatient Medications   Medication Sig Dispense Refill    aspirin 81 MG tablet Take by mouth      Calcium Carb-Cholecalciferol (CALTRATE 600+D) 600-800 MG-UNIT TABS Take by mouth      Flaxseed, Linseed, (FLAX SEED OIL) 1300 MG CAPS Take by mouth      metoprolol succinate (TOPROL-XL) 25 mg 24 hr tablet TAKE 1 TABLET BY MOUTH EVERY DAY 90 tablet 3    nitroglycerin (NITROSTAT) 0 4 mg SL tablet Place 1 tablet (0 4 mg total) under the tongue every 5 (five) minutes as needed (chest pain) 30 tablet 1    rosuvastatin (CRESTOR) 20 MG tablet TAKE 1 TABLET BY MOUTH EVERY DAY 90 tablet 3     No current facility-administered medications for this visit  Allergies: Allergies   Allergen Reactions    Lisinopril Cough    Epinephrine Nausea Only and Palpitations     Other reaction(s): Nausea and/or vomiting  Other reaction(s): Palpitations  Other reaction(s): Palpitations      Physical Exam:     /80 (BP Location: Left arm, Patient Position: Sitting, Cuff Size: Large)   Pulse 64   Resp 18   Ht 5' 2" (1 575 m)   Wt 104 kg (230 lb)   SpO2 98%   BMI 42 07 kg/m²     Physical Exam  Constitutional:       General: She is not in acute distress  Appearance: She is well-developed  She is obese   She is not diaphoretic  HENT:      Head: Normocephalic  Right Ear: External ear normal       Left Ear: External ear normal       Nose: Nose normal    Eyes:      General: No scleral icterus  Right eye: No discharge  Left eye: No discharge  Conjunctiva/sclera: Conjunctivae normal       Pupils: Pupils are equal, round, and reactive to light  Neck:      Thyroid: No thyromegaly  Trachea: No tracheal deviation  Cardiovascular:      Rate and Rhythm: Normal rate and regular rhythm  Heart sounds: Normal heart sounds  No murmur heard  Pulmonary:      Effort: Pulmonary effort is normal  No respiratory distress  Breath sounds: Normal breath sounds  Abdominal:      General: Bowel sounds are normal  There is no distension  Palpations: Abdomen is soft  Tenderness: There is no abdominal tenderness  Musculoskeletal:         General: No tenderness or deformity  Normal range of motion  Lymphadenopathy:      Cervical: No cervical adenopathy  Skin:     General: Skin is warm  Coloration: Skin is not pale  Findings: No erythema or rash  Neurological:      General: No focal deficit present  Cranial Nerves: No cranial nerve deficit        Coordination: Coordination normal    Psychiatric:         Behavior: Behavior normal           Reji Fregoso MD  [facility]

## 2022-03-02 NOTE — PATIENT INSTRUCTIONS
Wellness Visit for Adults   AMBULATORY CARE:   A wellness visit  is when you see your healthcare provider to get screened for health problems  Your healthcare provider will also give you advice on how to stay healthy  Write down your questions so you remember to ask them  Ask your healthcare provider how often you should have a wellness visit  What happens at a wellness visit:  Your healthcare provider will ask about your health, and your family history of health problems  This includes high blood pressure, heart disease, and cancer  He or she will ask if you have symptoms that concern you, if you smoke, and about your mood  You may also be asked about your intake of medicines, supplements, food, and alcohol  Any of the following may be done:  · Your weight  will be checked  Your height may also be checked so your body mass index (BMI) can be calculated  Your BMI shows if you are at a healthy weight  · Your blood pressure  and heart rate will be checked  Your temperature may also be checked  · Blood and urine tests  may be done  Blood tests may be done to check your cholesterol levels  Abnormal cholesterol levels increase your risk for heart disease and stroke  You may also need a blood or urine test to check for diabetes if you are at increased risk  Urine tests may be done to look for signs of an infection or kidney disease  · A physical exam  includes checking your heartbeat and lungs with a stethoscope  Your healthcare provider may also check your skin to look for sun damage  · Screening tests  may be recommended  A screening test is done to check for diseases that may not cause symptoms  The screening tests you may need depend on your age, gender, family history, and lifestyle habits  For example, colorectal screening may be recommended if you are 48years old or older  Screening tests you need if you are a woman:   · A Pap smear  is used to screen for cervical cancer   Pap smears are usually done every 3 to 5 years depending on your age  You may need them more often if you have had abnormal Pap smear test results in the past  Ask your healthcare provider how often you should have a Pap smear  · A mammogram  is an x-ray of your breasts to screen for breast cancer  Experts recommend mammograms every 2 years starting at age 48 years  You may need a mammogram at age 52 years or younger if you have an increased risk for breast cancer  Talk to your healthcare provider about when you should start having mammograms and how often you need them  Vaccines you may need:   · Get an influenza vaccine  every year  The influenza vaccine protects you from the flu  Several types of viruses cause the flu  The viruses change over time, so new vaccines are made each year  · Get a tetanus-diphtheria (Td) booster vaccine  every 10 years  This vaccine protects you against tetanus and diphtheria  Tetanus is a severe infection that may cause painful muscle spasms and lockjaw  Diphtheria is a severe bacterial infection that causes a thick covering in the back of your mouth and throat  · Get a human papillomavirus (HPV) vaccine  if you are female and aged 23 to 32 or male 23 to 24 and never received it  This vaccine protects you from HPV infection  HPV is the most common infection spread by sexual contact  HPV may also cause vaginal, penile, and anal cancers  · Get a pneumococcal vaccine  if you are aged 72 years or older  The pneumococcal vaccine is an injection given to protect you from pneumococcal disease  Pneumococcal disease is an infection caused by pneumococcal bacteria  The infection may cause pneumonia, meningitis, or an ear infection  · Get a shingles vaccine  if you are 60 or older, even if you have had shingles before  The shingles vaccine is an injection to protect you from the varicella-zoster virus  This is the same virus that causes chickenpox   Shingles is a painful rash that develops in people who had chickenpox or have been exposed to the virus  How to eat healthy:  My Plate is a model for planning healthy meals  It shows the types and amounts of foods that should go on your plate  Fruits and vegetables make up about half of your plate, and grains and protein make up the other half  A serving of dairy is included on the side of your plate  The amount of calories and serving sizes you need depends on your age, gender, weight, and height  Examples of healthy foods are listed below:  · Eat a variety of vegetables  such as dark green, red, and orange vegetables  You can also include canned vegetables low in sodium (salt) and frozen vegetables without added butter or sauces  · Eat a variety of fresh fruits , canned fruit in 100% juice, frozen fruit, and dried fruit  · Include whole grains  At least half of the grains you eat should be whole grains  Examples include whole-wheat bread, wheat pasta, brown rice, and whole-grain cereals such as oatmeal     · Eat a variety of protein foods such as seafood (fish and shellfish), lean meat, and poultry without skin (turkey and chicken)  Examples of lean meats include pork leg, shoulder, or tenderloin, and beef round, sirloin, tenderloin, and extra lean ground beef  Other protein foods include eggs and egg substitutes, beans, peas, soy products, nuts, and seeds  · Choose low-fat dairy products such as skim or 1% milk or low-fat yogurt, cheese, and cottage cheese  · Limit unhealthy fats  such as butter, hard margarine, and shortening  Exercise:  Exercise at least 30 minutes per day on most days of the week  Some examples of exercise include walking, biking, dancing, and swimming  You can also fit in more physical activity by taking the stairs instead of the elevator or parking farther away from stores  Include muscle strengthening activities 2 days each week  Regular exercise provides many health benefits   It helps you manage your weight, and decreases your risk for type 2 diabetes, heart disease, stroke, and high blood pressure  Exercise can also help improve your mood  Ask your healthcare provider about the best exercise plan for you  General health and safety guidelines:   · Do not smoke  Nicotine and other chemicals in cigarettes and cigars can cause lung damage  Ask your healthcare provider for information if you currently smoke and need help to quit  E-cigarettes or smokeless tobacco still contain nicotine  Talk to your healthcare provider before you use these products  · Limit alcohol  A drink of alcohol is 12 ounces of beer, 5 ounces of wine, or 1½ ounces of liquor  · Lose weight, if needed  Being overweight increases your risk of certain health conditions  These include heart disease, high blood pressure, type 2 diabetes, and certain types of cancer  · Protect your skin  Do not sunbathe or use tanning beds  Use sunscreen with a SPF 15 or higher  Apply sunscreen at least 15 minutes before you go outside  Reapply sunscreen every 2 hours  Wear protective clothing, hats, and sunglasses when you are outside  · Drive safely  Always wear your seatbelt  Make sure everyone in your car wears a seatbelt  A seatbelt can save your life if you are in an accident  Do not use your cell phone when you are driving  This could distract you and cause an accident  Pull over if you need to make a call or send a text message  · Practice safe sex  Use latex condoms if are sexually active and have more than one partner  Your healthcare provider may recommend screening tests for sexually transmitted infections (STIs)  · Wear helmets, lifejackets, and protective gear  Always wear a helmet when you ride a bike or motorcycle, go skiing, or play sports that could cause a head injury  Wear protective equipment when you play sports  Wear a lifejacket when you are on a boat or doing water sports      © Copyright [employer] 2022 Information is for End User's use only and may not be sold, redistributed or otherwise used for commercial purposes  All illustrations and images included in CareNotes® are the copyrighted property of A D A M , Inc  or Maurice Barrera  The above information is an  only  It is not intended as medical advice for individual conditions or treatments  Talk to your doctor, nurse or pharmacist before following any medical regimen to see if it is safe and effective for you  Weight Management   AMBULATORY CARE:   Why it is important to manage your weight:  Being overweight increases your risk of health conditions such as heart disease, high blood pressure, type 2 diabetes, and certain types of cancer  It can also increase your risk for osteoarthritis, sleep apnea, and other respiratory problems  Aim for a slow, steady weight loss  Even a small amount of weight loss can lower your risk of health problems  Risks of being overweight:  Extra weight can cause many health problems, including the following:  · Diabetes (high blood sugar level)    · High blood pressure or high cholesterol    · Heart disease    · Stroke    · Gallbladder or liver disease    · Cancer of the colon, breast, prostate, liver, or kidney    · Sleep apnea    · Arthritis or gout    Screening  is done to check for health conditions before you have signs or symptoms  If you are 28to 79years old, your blood sugar level may be checked every 3 years for signs of prediabetes or diabetes  Your healthcare provider will check your blood pressure at each visit  High blood pressure can lead to a stroke or other problems  Your provider may check for signs of heart disease, cancer, or other health problems  How to lose weight safely:  A safe and healthy way to lose weight is to eat fewer calories and get regular exercise  · You can lose up about 1 pound a week by decreasing the number of calories you eat by 500 calories each day   You can decrease calories by eating smaller portion sizes or by cutting out high-calorie foods  Read labels to find out how many calories are in the foods you eat  · You can also burn calories with exercise such as walking, swimming, or biking  You will be more likely to keep weight off if you make these changes part of your lifestyle  Exercise at least 30 minutes per day on most days of the week  You can also fit in more physical activity by taking the stairs instead of the elevator or parking farther away from stores  Ask your healthcare provider about the best exercise plan for you  Healthy meal plan for weight management:  A healthy meal plan includes a variety of foods, contains fewer calories, and helps you stay healthy  A healthy meal plan includes the following:     · Eat whole-grain foods more often  A healthy meal plan should contain fiber  Fiber is the part of grains, fruits, and vegetables that is not broken down by your body  Whole-grain foods are healthy and provide extra fiber in your diet  Some examples of whole-grain foods are whole-wheat breads and pastas, oatmeal, brown rice, and bulgur  · Eat a variety of vegetables every day  Include dark, leafy greens such as spinach, kale, jerome greens, and mustard greens  Eat yellow and orange vegetables such as carrots, sweet potatoes, and winter squash  · Eat a variety of fruits every day  Choose fresh or canned fruit (canned in its own juice or light syrup) instead of juice  Fruit juice has very little or no fiber  · Eat low-fat dairy foods  Drink fat-free (skim) milk or 1% milk  Eat fat-free yogurt and low-fat cottage cheese  Try low-fat cheeses such as mozzarella and other reduced-fat cheeses  · Choose meat and other protein foods that are low in fat  Choose beans or other legumes such as split peas or lentils  Choose fish, skinless poultry (chicken or turkey), or lean cuts of red meat (beef or pork)   Before you cook meat or poultry, cut off any visible fat  · Use less fat and oil  Try baking foods instead of frying them  Add less fat, such as margarine, sour cream, regular salad dressing and mayonnaise to foods  Eat fewer high-fat foods  Some examples of high-fat foods include french fries, doughnuts, ice cream, and cakes  · Eat fewer sweets  Limit foods and drinks that are high in sugar  This includes candy, cookies, regular soda, and sweetened drinks  Ways to decrease calories:   · Eat smaller portions  ? Use a small plate with smaller servings  ? Do not eat second helpings  ? When you eat at a restaurant, ask for a box and place half of your meal in the box before you eat  ? Share an entrée with someone else  · Replace high-calorie snacks with healthy, low-calorie snacks  ? Choose fresh fruit, vegetables, fat-free rice cakes, or air-popped popcorn instead of potato chips, nuts, or chocolate  ? Choose water or calorie-free drinks instead of soda or sweetened drinks  · Do not shop for groceries when you are hungry  You may be more likely to make unhealthy food choices  Take a grocery list of healthy foods and shop after you have eaten  · Eat regular meals  Do not skip meals  Skipping meals can lead to overeating later in the day  This can make it harder for you to lose weight  Eat a healthy snack in place of a meal if you do not have time to eat a regular meal  Talk with a dietitian to help you create a meal plan and schedule that is right for you  Other things to consider as you try to lose weight:   · Be aware of situations that may give you the urge to overeat, such as eating while watching television  Find ways to avoid these situations  For example, read a book, go for a walk, or do crafts  · Meet with a weight loss support group or friends who are also trying to lose weight  This may help you stay motivated to continue working on your weight loss goals      © Copyright [employer] 2022 Information is for End User's use only and may not be sold, redistributed or otherwise used for commercial purposes  All illustrations and images included in CareNotes® are the copyrighted property of A D A M , Inc  or Maurice Barrera  The above information is an  only  It is not intended as medical advice for individual conditions or treatments  Talk to your doctor, nurse or pharmacist before following any medical regimen to see if it is safe and effective for you

## 2022-03-03 ENCOUNTER — TELEPHONE (OUTPATIENT)
Dept: ADMINISTRATIVE | Facility: OTHER | Age: 67
End: 2022-03-03

## 2022-03-03 NOTE — TELEPHONE ENCOUNTER
----- Message from Rebeca Gutierrez, [address] [place] sent at 3/2/2022  3:53 PM EST -----  Regarding: PAP/ [facility]  03/02/22 3:54 PM    Hello, our patient Sandra Boothe has had Pap Smear (HPV) aka Cervical Cancer Screening completed/performed  Please assist in updating the patient chart by pulling the Care Everywhere (CE) document  The date of service is 10/29/2021       Thank you,  Rebeca Gutierrez MA  [facility]

## 2022-03-03 NOTE — TELEPHONE ENCOUNTER
Upon review of the In Basket request we were able to locate, review, and update the patient chart as requested for Pap Smear (HPV) aka Cervical Cancer Screening  Any additional questions or concerns should be emailed to the Practice Liaisons via [email or web address] email, please do not reply via In Basket      Thank you  Hortencia Ortiz

## 2022-06-24 DIAGNOSIS — I25.10 ASCVD (ARTERIOSCLEROTIC CARDIOVASCULAR DISEASE): ICD-10-CM

## 2022-06-24 DIAGNOSIS — E78.00 HYPERCHOLESTEROLEMIA: ICD-10-CM

## 2022-06-24 RX ORDER — METOPROLOL SUCCINATE 25 MG/1
TABLET, EXTENDED RELEASE ORAL
Qty: 90 TABLET | Refills: 3 | Status: SHIPPED | OUTPATIENT
Start: 2022-06-24

## 2022-08-16 LAB — HBA1C MFR BLD HPLC: 5.4 %

## 2022-08-30 ENCOUNTER — RA CDI HCC (OUTPATIENT)
Dept: OTHER | Facility: HOSPITAL | Age: 67
End: 2022-08-30

## 2022-08-30 NOTE — PROGRESS NOTES
[facility] [place] [facility]  coding opportunities       Chart reviewed, no opportunity found: CHART REVIEWED, NO OPPORTUNITY FOUND        Patients Insurance        Commercial Insurance: [address] [place] [address]

## 2022-08-31 ENCOUNTER — OFFICE VISIT (OUTPATIENT)
Dept: FAMILY MEDICINE CLINIC | Facility: CLINIC | Age: 67
End: 2022-08-31
Payer: COMMERCIAL

## 2022-08-31 VITALS
TEMPERATURE: 98 F | OXYGEN SATURATION: 98 % | DIASTOLIC BLOOD PRESSURE: 80 MMHG | BODY MASS INDEX: 42.33 KG/M2 | HEART RATE: 60 BPM | WEIGHT: 230 LBS | HEIGHT: 62 IN | SYSTOLIC BLOOD PRESSURE: 130 MMHG

## 2022-08-31 DIAGNOSIS — E66.01 CLASS 3 SEVERE OBESITY DUE TO EXCESS CALORIES WITH SERIOUS COMORBIDITY AND BODY MASS INDEX (BMI) OF 40.0 TO 44.9 IN ADULT (HCC): ICD-10-CM

## 2022-08-31 DIAGNOSIS — I25.10 ASCVD (ARTERIOSCLEROTIC CARDIOVASCULAR DISEASE): Primary | ICD-10-CM

## 2022-08-31 DIAGNOSIS — Z23 ENCOUNTER FOR IMMUNIZATION: ICD-10-CM

## 2022-08-31 DIAGNOSIS — R73.9 HYPERGLYCEMIA: ICD-10-CM

## 2022-08-31 DIAGNOSIS — I10 ESSENTIAL HYPERTENSION: ICD-10-CM

## 2022-08-31 DIAGNOSIS — E78.00 HYPERCHOLESTEROLEMIA: ICD-10-CM

## 2022-08-31 DIAGNOSIS — Z95.5 PRESENCE OF DRUG-ELUTING STENT IN ANTERIOR DESCENDING BRANCH OF LEFT CORONARY ARTERY: ICD-10-CM

## 2022-08-31 DIAGNOSIS — I20.8 ANGINA OF EFFORT (HCC): ICD-10-CM

## 2022-08-31 DIAGNOSIS — I11.9 HYPERTENSIVE HEART DISEASE WITHOUT CONGESTIVE HEART FAILURE: ICD-10-CM

## 2022-08-31 PROBLEM — I20.89 ANGINA OF EFFORT: Status: ACTIVE | Noted: 2022-08-31

## 2022-08-31 PROCEDURE — 90677 PCV20 VACCINE IM: CPT

## 2022-08-31 PROCEDURE — 99214 OFFICE O/P EST MOD 30 MIN: CPT | Performed by: FAMILY MEDICINE

## 2022-08-31 PROCEDURE — 90471 IMMUNIZATION ADMIN: CPT

## 2022-08-31 NOTE — ASSESSMENT & PLAN NOTE
He is having some recurrent chest pain with exertion and angina needs to be ruled out  She is seeing her cardiologist in 2 days  I am going to reach out to let him know what is going on

## 2022-08-31 NOTE — PROGRESS NOTES
Assessment/Plan:       Problem List Items Addressed This Visit        Cardiovascular and Mediastinum    ASCVD (arteriosclerotic cardiovascular disease) - Primary     She does note her current chest discomfort is not anywhere near what it was when she required a stent back in 2007  She is going to take it easy until she sees Cardiology in 2 days          Relevant Orders    CBC and differential    Comprehensive metabolic panel    Hemoglobin A1C    Lipid Panel with Direct LDL reflex    Essential hypertension     Well controlled at this time         Relevant Orders    CBC and differential    Comprehensive metabolic panel    Lipid Panel with Direct LDL reflex    Hypertensive heart disease without congestive heart failure    Angina of effort [facility])     He is having some recurrent chest pain with exertion and angina needs to be ruled out  She is seeing her cardiologist in 2 days  I am going to reach out to let him know what is going on  Relevant Orders    CBC and differential    Comprehensive metabolic panel       Other    Hypercholesterolemia    Relevant Orders    Lipid Panel with Direct LDL reflex    Hyperglycemia    Relevant Orders    Hemoglobin A1C    Class 3 severe obesity due to excess calories with serious comorbidity and body mass index (BMI) of 40 0 to 44 9 in adult [facility])    Presence of drug-eluting stent in anterior descending branch of left coronary artery      Other Visit Diagnoses     Encounter for immunization        Relevant Orders    Pneumococcal Conjugate Vaccine 20-valent (PCV20) (Completed)            Subjective:      Patient ID: Alisson Mckeon is a 73 y o  female  HPI patient presents today for follow-up for chronic health issues  She does remain quite active but has been noting some tightness in her chest over the past 3-4 months  This occurs when she is exerting herself, especially if she is pulling something heavy such as a [place] at work    The symptoms do resolve with rest   She notes this discomfort is much different than when she required a stent back in 2007  Her labs were reviewed her excellent  Blood pressure is very well controlled  She is due to see her cardiologist in 2 days      The following portions of the patient's history were reviewed and updated as appropriate: allergies, current medications, past family history, past medical history, past social history, past surgical history and problem list       Current Outpatient Medications:     aspirin 81 MG tablet, Take by mouth, Disp: , Rfl:     Calcium Carb-Cholecalciferol 600-800 MG-UNIT TABS, Take by mouth, Disp: , Rfl:     Flaxseed, Linseed, (FLAX SEED OIL) 1300 MG CAPS, Take by mouth, Disp: , Rfl:     metoprolol succinate (TOPROL-XL) 25 mg 24 hr tablet, TAKE 1 TABLET BY MOUTH EVERY DAY, Disp: 90 tablet, Rfl: 3    nitroglycerin (NITROSTAT) 0 4 mg SL tablet, Place 1 tablet (0 4 mg total) under the tongue every 5 (five) minutes as needed (chest pain), Disp: 30 tablet, Rfl: 1    rosuvastatin (CRESTOR) 20 MG tablet, TAKE 1 TABLET BY MOUTH EVERY DAY, Disp: 90 tablet, Rfl: 3     Review of Systems   Constitutional: Negative for appetite change, chills, fatigue, fever and unexpected weight change  HENT: Negative for trouble swallowing  Eyes: Negative for visual disturbance  Respiratory: Negative for cough, chest tightness, shortness of breath and wheezing  Cardiovascular: Positive for chest pain  Negative for palpitations and leg swelling  Gastrointestinal: Negative for abdominal distention, abdominal pain, blood in stool, constipation and diarrhea  Endocrine: Negative for polyuria  Genitourinary: Negative for difficulty urinating and flank pain  Musculoskeletal: Negative for arthralgias and myalgias  Skin: Negative for rash  Neurological: Negative for dizziness and light-headedness  Hematological: Negative for adenopathy  Does not bruise/bleed easily     Psychiatric/Behavioral: Negative for dysphoric mood and sleep disturbance  The patient is not nervous/anxious  Objective:      /80 (BP Location: Left arm, Patient Position: Sitting, Cuff Size: Large)   Pulse 60   Temp 98 °F (36 7 °C)   Ht 5' 2" (1 575 m)   Wt 104 kg (230 lb)   SpO2 98%   BMI 42 07 kg/m²          Physical Exam  Vitals reviewed  Constitutional:       General: She is not in acute distress  Appearance: She is well-developed  She is not diaphoretic  HENT:      Head: Normocephalic  Eyes:      General:         Right eye: No discharge  Left eye: No discharge  Pupils: Pupils are equal, round, and reactive to light  Neck:      Thyroid: No thyromegaly  Trachea: No tracheal deviation  Cardiovascular:      Rate and Rhythm: Normal rate and regular rhythm  Heart sounds: Normal heart sounds  No murmur heard  Pulmonary:      Effort: Pulmonary effort is normal  No respiratory distress  Breath sounds: No wheezing or rales  Abdominal:      General: There is no distension  Palpations: Abdomen is soft  Tenderness: There is no abdominal tenderness  Musculoskeletal:         General: Normal range of motion  Right lower leg: No edema  Left lower leg: No edema  Lymphadenopathy:      Cervical: No cervical adenopathy  Skin:     General: Skin is warm  Findings: No erythema  Neurological:      General: No focal deficit present  Mental Status: She is alert and oriented to person, place, and time  Gait: Gait normal    Psychiatric:         Thought Content:  Thought content normal          Judgment: Judgment normal            Gi Metzger MD

## 2022-08-31 NOTE — ASSESSMENT & PLAN NOTE
She does note her current chest discomfort is not anywhere near what it was when she required a stent back in 2007   She is going to take it easy until she sees Cardiology in 2 days

## 2022-10-24 ENCOUNTER — VBI (OUTPATIENT)
Dept: ADMINISTRATIVE | Facility: OTHER | Age: 67
End: 2022-10-24

## 2022-11-07 ENCOUNTER — HOSPITAL ENCOUNTER (OUTPATIENT)
Dept: RADIOLOGY | Age: 67
Discharge: HOME | End: 2022-11-07

## 2022-11-07 VITALS — WEIGHT: 228 LBS | HEIGHT: 62 IN | BODY MASS INDEX: 41.96 KG/M2

## 2022-11-07 DIAGNOSIS — Z12.31 SCREENING MAMMOGRAM FOR HIGH-RISK PATIENT: ICD-10-CM

## 2022-12-28 ENCOUNTER — TELEPHONE (OUTPATIENT)
Dept: FAMILY MEDICINE CLINIC | Facility: CLINIC | Age: 67
End: 2022-12-28

## 2022-12-28 NOTE — TELEPHONE ENCOUNTER
Pt called asking for a script for some covid tests  I told her we are not unfortunately able to do that and to try calling other pharmacies  Pt was okay with trying that

## 2023-01-14 DIAGNOSIS — E78.00 HYPERCHOLESTEROLEMIA: ICD-10-CM

## 2023-01-14 RX ORDER — ROSUVASTATIN CALCIUM 20 MG/1
TABLET, COATED ORAL
Qty: 90 TABLET | Refills: 3 | Status: SHIPPED | OUTPATIENT
Start: 2023-01-14

## 2023-02-27 LAB — HBA1C MFR BLD HPLC: 5.1 %

## 2023-03-01 ENCOUNTER — RA CDI HCC (OUTPATIENT)
Dept: OTHER | Facility: HOSPITAL | Age: 68
End: 2023-03-01

## 2023-03-01 NOTE — PROGRESS NOTES
NOT on the BPA- please assess using MEAT for 2023 billing        [address]  coding opportunities          Chart Reviewed number of suggestions sent to Provider: 1     Patients Insurance        Commercial Insurance: [employer]

## 2023-03-08 ENCOUNTER — OFFICE VISIT (OUTPATIENT)
Dept: FAMILY MEDICINE CLINIC | Facility: CLINIC | Age: 68
End: 2023-03-08

## 2023-03-08 VITALS
OXYGEN SATURATION: 97 % | HEIGHT: 62 IN | BODY MASS INDEX: 42.91 KG/M2 | WEIGHT: 233.2 LBS | DIASTOLIC BLOOD PRESSURE: 84 MMHG | HEART RATE: 68 BPM | TEMPERATURE: 97.8 F | SYSTOLIC BLOOD PRESSURE: 140 MMHG | RESPIRATION RATE: 14 BRPM

## 2023-03-08 DIAGNOSIS — Z00.00 ANNUAL PHYSICAL EXAM: Primary | ICD-10-CM

## 2023-03-08 DIAGNOSIS — I10 ESSENTIAL HYPERTENSION: ICD-10-CM

## 2023-03-08 DIAGNOSIS — E66.01 CLASS 3 SEVERE OBESITY DUE TO EXCESS CALORIES WITH SERIOUS COMORBIDITY AND BODY MASS INDEX (BMI) OF 40.0 TO 44.9 IN ADULT (HCC): ICD-10-CM

## 2023-03-08 NOTE — PATIENT INSTRUCTIONS
Start Wegovy once a week, keep it in the fridge  Update me in few weeks on how you tolerate medication  Goal in 6 months is 220 lb

## 2023-03-09 ENCOUNTER — TELEPHONE (OUTPATIENT)
Dept: FAMILY MEDICINE CLINIC | Facility: CLINIC | Age: 68
End: 2023-03-09

## 2023-03-09 NOTE — TELEPHONE ENCOUNTER
Prior Authorization started today via cover my meds for [facility]FORT ELISA   Key [id number]

## 2023-03-09 NOTE — PROGRESS NOTES
Assessment/Plan:    Essential hypertension  Blood pressure rechecked by me 130/80  Continue the same dose of metoprolol  Class 3 severe obesity due to excess calories with serious comorbidity and body mass index (BMI) of 40 0 to 44 9 in adult [facility])  We will start patient on Wegovy, discussed in detail how to use the medication, discussed side effects  She will update me in few weeks on how she feels  Diagnoses and all orders for this visit:    Annual physical exam    Class 3 severe obesity due to excess calories with serious comorbidity and body mass index (BMI) of 40 0 to 44 9 in adult ([facility] [address] )  -     Semaglutide-Weight Management (WEGOVY) 0 25 MG/0 5ML; Inject 0 5 mL (0 25 mg total) under the skin once a week for 4 doses    Essential hypertension          Subjective:      Patient ID: Shelley Cherry is a 78 y o  female  Patient came today for annual checkup  Vital signs are acceptable except of slightly elevated blood pressures and elevated BMI  Up-to-date on colonoscopy and mammogram   Up-to-date on vaccinations  The following portions of the patient's history were reviewed and updated as appropriate: allergies, current medications, past family history, past medical history, past social history, past surgical history, and problem list     Review of Systems   Constitutional: Negative for activity change, appetite change, chills, fatigue and fever  HENT: Negative for congestion, ear pain, rhinorrhea and sore throat  Respiratory: Negative for cough, shortness of breath and wheezing  Cardiovascular: Negative for chest pain, palpitations and leg swelling  Gastrointestinal: Negative for abdominal distention, abdominal pain, diarrhea, nausea and vomiting  Genitourinary: Negative for difficulty urinating, frequency and pelvic pain  Musculoskeletal: Negative for arthralgias, back pain and neck pain  Skin: Negative for rash     Neurological: Negative for dizziness, tremors, weakness, numbness and headaches  Objective:      /84 (BP Location: Left arm, Patient Position: Sitting, Cuff Size: Large)   Pulse 68   Temp 97 8 °F (36 6 °C) (Temporal)   Resp 14   Ht 5' 2" (1 575 m)   Wt 106 kg (233 lb 3 2 oz)   SpO2 97%   BMI 42 65 kg/m²     Allergies   Allergen Reactions   • Lisinopril Cough   • Epinephrine Nausea Only and Palpitations     Other reaction(s): Nausea and/or vomiting  Other reaction(s): Palpitations  Other reaction(s): Palpitations          Current Outpatient Medications:   •  aspirin 81 MG tablet, Take by mouth, Disp: , Rfl:   •  Calcium Carb-Cholecalciferol 600-800 MG-UNIT TABS, Take by mouth, Disp: , Rfl:   •  Flaxseed, Linseed, (FLAX SEED OIL) 1300 MG CAPS, Take by mouth, Disp: , Rfl:   •  metoprolol succinate (TOPROL-XL) 25 mg 24 hr tablet, TAKE 1 TABLET BY MOUTH EVERY DAY, Disp: 90 tablet, Rfl: 3  •  nitroglycerin (NITROSTAT) 0 4 mg SL tablet, Place 1 tablet (0 4 mg total) under the tongue every 5 (five) minutes as needed (chest pain), Disp: 30 tablet, Rfl: 1  •  rosuvastatin (CRESTOR) 20 MG tablet, TAKE 1 TABLET BY MOUTH EVERY DAY, Disp: 90 tablet, Rfl: 3  •  Semaglutide-Weight Management (WEGOVY) 0 25 MG/0 5ML, Inject 0 5 mL (0 25 mg total) under the skin once a week for 4 doses, Disp: 2 mL, Rfl: 0     Patient Instructions   Start Wegovy once a week, keep it in the fridge  Update me in few weeks on how you tolerate medication  Goal in 6 months is 220 lb  Physical Exam  Constitutional:       General: She is not in acute distress  Appearance: Normal appearance  She is not ill-appearing  HENT:      Nose: No rhinorrhea  Cardiovascular:      Rate and Rhythm: Normal rate and regular rhythm  Heart sounds: No murmur heard  No friction rub  No gallop  Pulmonary:      Effort: No respiratory distress  Breath sounds: No wheezing or rhonchi  Chest:      Chest wall: No tenderness  Abdominal:      General: There is no distension  Palpations: There is no mass  Tenderness: There is no abdominal tenderness  There is no guarding or rebound  Hernia: No hernia is present  Musculoskeletal:         General: No swelling or tenderness  Lymphadenopathy:      Cervical: No cervical adenopathy  Skin:     Coloration: Skin is not jaundiced  Findings: No rash  Neurological:      Mental Status: She is alert and oriented to person, place, and time  Motor: No weakness        Gait: Gait normal    Psychiatric:         Mood and Affect: Mood normal          Behavior: Behavior normal

## 2023-03-09 NOTE — ASSESSMENT & PLAN NOTE
We will start patient on Wegovy, discussed in detail how to use the medication, discussed side effects  She will update me in few weeks on how she feels

## 2023-03-13 NOTE — TELEPHONE ENCOUNTER
PA Approved  LVM for pharmacy to advise  Your prior authorization for Bishop Bernabe has been approved! MORE INFO  Personalized support and financial assistance may be available through the [employer] program  For more information, and to see program requirements, click on the More Info button to the right  Message from plan: The request has been approved  The authorization is effective for a maximum of 3 fills from 03/10/2023 to 06/08/2023, as long as the member is enrolled in their current health plan  The request was approved as submitted  This request has been approved with a quantity limit of 2 mL per 28 days  In addition, the following authorizations have been approved from 03/10/2023 to 06/08/2023: Authorization [id number] for Bishop Bernabe 0 5mg/0 5mL pen for 2mL per 28 days for 3 fills  Authorization [id number] for Bishop Bernabe 1mg/0 5mL pen for 2mL per 28 days for 3 fills  Authorization [id number] for Wegovy 1 7mg/0 75mL pen for 3mL per 28 days for 3 fills  Authorization [id number] for Wegovy 2 4mg/0 75mL pen for 3mL per 28 days for 3 fills  A written notification letter will follow with additional details

## 2023-03-22 DIAGNOSIS — E66.01 CLASS 3 SEVERE OBESITY DUE TO EXCESS CALORIES WITH SERIOUS COMORBIDITY AND BODY MASS INDEX (BMI) OF 40.0 TO 44.9 IN ADULT (HCC): Primary | ICD-10-CM

## 2023-04-26 DIAGNOSIS — E66.01 CLASS 3 SEVERE OBESITY DUE TO EXCESS CALORIES WITH SERIOUS COMORBIDITY AND BODY MASS INDEX (BMI) OF 40.0 TO 44.9 IN ADULT (HCC): Primary | ICD-10-CM

## 2023-06-18 DIAGNOSIS — E78.00 HYPERCHOLESTEROLEMIA: ICD-10-CM

## 2023-06-18 DIAGNOSIS — I25.10 ASCVD (ARTERIOSCLEROTIC CARDIOVASCULAR DISEASE): ICD-10-CM

## 2023-06-18 RX ORDER — METOPROLOL SUCCINATE 25 MG/1
TABLET, EXTENDED RELEASE ORAL
Qty: 90 TABLET | Refills: 3 | Status: SHIPPED | OUTPATIENT
Start: 2023-06-18

## 2023-09-07 ENCOUNTER — RA CDI HCC (OUTPATIENT)
Dept: OTHER | Facility: HOSPITAL | Age: 68
End: 2023-09-07

## 2023-09-07 NOTE — PROGRESS NOTES
[address] [facility] coding opportunities          Chart Reviewed number of suggestions sent to Provider: 1     Patients Insurance        Commercial Insurance: Prabhakar Arriaga

## 2023-09-13 ENCOUNTER — OFFICE VISIT (OUTPATIENT)
Dept: FAMILY MEDICINE CLINIC | Facility: CLINIC | Age: 68
End: 2023-09-13
Payer: COMMERCIAL

## 2023-09-13 VITALS
HEART RATE: 62 BPM | SYSTOLIC BLOOD PRESSURE: 128 MMHG | DIASTOLIC BLOOD PRESSURE: 70 MMHG | BODY MASS INDEX: 41.88 KG/M2 | WEIGHT: 229 LBS | OXYGEN SATURATION: 99 %

## 2023-09-13 DIAGNOSIS — E66.01 CLASS 3 SEVERE OBESITY DUE TO EXCESS CALORIES WITH SERIOUS COMORBIDITY AND BODY MASS INDEX (BMI) OF 40.0 TO 44.9 IN ADULT (HCC): ICD-10-CM

## 2023-09-13 DIAGNOSIS — Z95.5 PRESENCE OF DRUG-ELUTING STENT IN ANTERIOR DESCENDING BRANCH OF LEFT CORONARY ARTERY: ICD-10-CM

## 2023-09-13 DIAGNOSIS — R73.9 HYPERGLYCEMIA: ICD-10-CM

## 2023-09-13 DIAGNOSIS — E78.00 HYPERCHOLESTEROLEMIA: ICD-10-CM

## 2023-09-13 DIAGNOSIS — I25.10 ASCVD (ARTERIOSCLEROTIC CARDIOVASCULAR DISEASE): ICD-10-CM

## 2023-09-13 DIAGNOSIS — I10 ESSENTIAL HYPERTENSION: Primary | ICD-10-CM

## 2023-09-13 PROBLEM — I20.8 ANGINA OF EFFORT (HCC): Status: RESOLVED | Noted: 2022-08-31 | Resolved: 2023-09-13

## 2023-09-13 PROBLEM — I20.89 ANGINA OF EFFORT: Status: RESOLVED | Noted: 2022-08-31 | Resolved: 2023-09-13

## 2023-09-13 PROCEDURE — 99214 OFFICE O/P EST MOD 30 MIN: CPT | Performed by: FAMILY MEDICINE

## 2023-09-13 RX ORDER — NITROGLYCERIN 0.4 MG/1
0.4 TABLET SUBLINGUAL
Qty: 30 TABLET | Refills: 1 | Status: SHIPPED | OUTPATIENT
Start: 2023-09-13

## 2023-09-13 NOTE — ASSESSMENT & PLAN NOTE
Well-controlled at this time. She did have some transient edema after recent vacation. Keep an eye on salt as well as alcohol when vacationing. Should this ever persist or she has shortness of breath she needs to be seen.

## 2023-09-13 NOTE — PROGRESS NOTES
Assessment/Plan:       Problem List Items Addressed This Visit        Cardiovascular and Mediastinum    ASCVD (arteriosclerotic cardiovascular disease)    Relevant Medications    nitroglycerin (Nitrostat) 0.4 mg SL tablet    Other Relevant Orders    Comprehensive metabolic panel    CBC and differential    Lipid Panel with Direct LDL reflex    Hemoglobin A1C    Essential hypertension - Primary     Well-controlled at this time. She did have some transient edema after recent vacation. Keep an eye on salt as well as alcohol when vacationing. Should this ever persist or she has shortness of breath she needs to be seen. Relevant Orders    Comprehensive metabolic panel    CBC and differential    Lipid Panel with Direct LDL reflex    Hemoglobin A1C       Other    Hypercholesterolemia    Relevant Orders    Comprehensive metabolic panel    Lipid Panel with Direct LDL reflex    Hyperglycemia    Relevant Orders    Hemoglobin A1C    Class 3 severe obesity due to excess calories with serious comorbidity and body mass index (BMI) of 40.0 to 44.9 in adult [facility])    Relevant Orders    Comprehensive metabolic panel    Lipid Panel with Direct LDL reflex    Presence of drug-eluting stent in anterior descending branch of left coronary artery    Relevant Orders    Lipid Panel with Direct LDL reflex         Subjective:      Patient ID: Jessica Sotelo is a 78 y.o. female. HPI patient presents today for follow-up for chronic health issues. Overall, she seems to be quite stable. She denies any current issues chest pain, shortness of breath or palpitations. She is staying active and has no cardiovascular mutations exercise. She did note some increased ankle swelling when on vacation recently. She admits she had increased salt in her diet as well as alcohol. This has all resolved. She denies any orthopnea.     The following portions of the patient's history were reviewed and updated as appropriate: allergies, current medications, past family history, past medical history, past social history, past surgical history and problem list.      Current Outpatient Medications:   •  aspirin 81 MG tablet, Take by mouth, Disp: , Rfl:   •  Calcium Carb-Cholecalciferol 600-800 MG-UNIT TABS, Take by mouth, Disp: , Rfl:   •  Flaxseed, Linseed, (FLAX SEED OIL) 1300 MG CAPS, Take by mouth, Disp: , Rfl:   •  metoprolol succinate (TOPROL-XL) 25 mg 24 hr tablet, TAKE 1 TABLET BY MOUTH EVERY DAY, Disp: 90 tablet, Rfl: 3  •  nitroglycerin (Nitrostat) 0.4 mg SL tablet, Place 1 tablet (0.4 mg total) under the tongue every 5 (five) minutes as needed (chest pain), Disp: 30 tablet, Rfl: 1  •  rosuvastatin (CRESTOR) 20 MG tablet, TAKE 1 TABLET BY MOUTH EVERY DAY, Disp: 90 tablet, Rfl: 3     Review of Systems   Constitutional: Negative for appetite change, chills, fatigue, fever and unexpected weight change. HENT: Negative for trouble swallowing. Eyes: Negative for visual disturbance. Respiratory: Negative for cough, chest tightness, shortness of breath and wheezing. Cardiovascular: Negative for chest pain, palpitations and leg swelling. Gastrointestinal: Negative for abdominal distention, abdominal pain, blood in stool, constipation and diarrhea. Endocrine: Negative for polyuria. Genitourinary: Negative for difficulty urinating and flank pain. Musculoskeletal: Negative for arthralgias and myalgias. Skin: Negative for rash. Neurological: Negative for dizziness and light-headedness. Hematological: Negative for adenopathy. Does not bruise/bleed easily. Psychiatric/Behavioral: Negative for dysphoric mood and sleep disturbance. The patient is not nervous/anxious. Objective:      /70 (BP Location: Left arm, Patient Position: Sitting, Cuff Size: Large)   Pulse 62   Wt 104 kg (229 lb)   SpO2 99%   BMI 41.88 kg/m²          Physical Exam  Vitals reviewed. Constitutional:       Appearance: She is well-developed.  She is obese.   HENT:      Head: Normocephalic. Cardiovascular:      Rate and Rhythm: Regular rhythm. Heart sounds: Normal heart sounds. No murmur heard. Pulmonary:      Effort: No respiratory distress. Breath sounds: No wheezing or rales. Abdominal:      General: There is no distension. Tenderness: There is no abdominal tenderness. Skin:     Findings: No erythema or rash. Neurological:      Mental Status: She is alert and oriented to person, place, and time.            Yany Baez MD

## 2023-11-13 ENCOUNTER — HOSPITAL ENCOUNTER (OUTPATIENT)
Dept: RADIOLOGY | Age: 68
Discharge: HOME | End: 2023-11-13
Payer: COMMERCIAL

## 2023-11-13 VITALS — BODY MASS INDEX: 42.14 KG/M2 | HEIGHT: 62 IN | WEIGHT: 229 LBS

## 2023-11-13 DIAGNOSIS — Z12.31 VISIT FOR SCREENING MAMMOGRAM: ICD-10-CM

## 2023-11-13 PROCEDURE — 77067 SCR MAMMO BI INCL CAD: CPT

## 2023-11-13 PROCEDURE — 77063 BREAST TOMOSYNTHESIS BI: CPT

## 2024-01-01 DIAGNOSIS — E78.00 HYPERCHOLESTEROLEMIA: ICD-10-CM

## 2024-01-01 RX ORDER — ROSUVASTATIN CALCIUM 20 MG/1
TABLET, COATED ORAL
Qty: 30 TABLET | Refills: 0 | Status: SHIPPED | OUTPATIENT
Start: 2024-01-01

## 2024-01-26 DIAGNOSIS — E78.00 HYPERCHOLESTEROLEMIA: ICD-10-CM

## 2024-01-26 RX ORDER — ROSUVASTATIN CALCIUM 20 MG/1
TABLET, COATED ORAL
Qty: 90 TABLET | Refills: 0 | Status: SHIPPED | OUTPATIENT
Start: 2024-01-26

## 2024-03-04 LAB — HBA1C MFR BLD HPLC: 5.3 %

## 2024-03-13 ENCOUNTER — OFFICE VISIT (OUTPATIENT)
Dept: FAMILY MEDICINE CLINIC | Facility: CLINIC | Age: 69
End: 2024-03-13
Payer: COMMERCIAL

## 2024-03-13 VITALS
BODY MASS INDEX: 42.98 KG/M2 | HEART RATE: 70 BPM | OXYGEN SATURATION: 98 % | SYSTOLIC BLOOD PRESSURE: 116 MMHG | WEIGHT: 235 LBS | DIASTOLIC BLOOD PRESSURE: 72 MMHG

## 2024-03-13 DIAGNOSIS — Z95.5 PRESENCE OF DRUG-ELUTING STENT IN ANTERIOR DESCENDING BRANCH OF LEFT CORONARY ARTERY: ICD-10-CM

## 2024-03-13 DIAGNOSIS — E66.01 CLASS 3 SEVERE OBESITY DUE TO EXCESS CALORIES WITH SERIOUS COMORBIDITY AND BODY MASS INDEX (BMI) OF 40.0 TO 44.9 IN ADULT (HCC): ICD-10-CM

## 2024-03-13 DIAGNOSIS — M17.12 PRIMARY OSTEOARTHRITIS OF LEFT KNEE: ICD-10-CM

## 2024-03-13 DIAGNOSIS — G47.33 OBSTRUCTIVE SLEEP APNEA SYNDROME: ICD-10-CM

## 2024-03-13 DIAGNOSIS — R73.9 HYPERGLYCEMIA: ICD-10-CM

## 2024-03-13 DIAGNOSIS — E78.00 HYPERCHOLESTEROLEMIA: ICD-10-CM

## 2024-03-13 DIAGNOSIS — I25.10 ASCVD (ARTERIOSCLEROTIC CARDIOVASCULAR DISEASE): Primary | ICD-10-CM

## 2024-03-13 DIAGNOSIS — I10 ESSENTIAL HYPERTENSION: ICD-10-CM

## 2024-03-13 PROCEDURE — 99214 OFFICE O/P EST MOD 30 MIN: CPT | Performed by: FAMILY MEDICINE

## 2024-03-13 RX ORDER — ROSUVASTATIN CALCIUM 20 MG/1
20 TABLET, COATED ORAL DAILY
Qty: 90 TABLET | Refills: 3 | Status: SHIPPED | OUTPATIENT
Start: 2024-03-13

## 2024-03-13 NOTE — PROGRESS NOTES
Assessment/Plan:       Problem List Items Addressed This Visit        Cardiovascular and Mediastinum    ASCVD (arteriosclerotic cardiovascular disease) - Primary     Respecters very well-controlled.  She sees cardiology next week.         Essential hypertension     Blood pressure is excellent today.         Relevant Orders    CBC and differential    Comprehensive metabolic panel       Respiratory    Obstructive sleep apnea syndrome     I would encourage her to remain compliant with CPAP            Musculoskeletal and Integument    Osteoarthritis of left knee    Relevant Orders    Ambulatory referral to Orthopedic Surgery       Other    Hypercholesterolemia    Relevant Medications    rosuvastatin (CRESTOR) 20 MG tablet    Other Relevant Orders    CBC and differential    Comprehensive metabolic panel    Lipid Panel with Direct LDL reflex    Hyperglycemia    Relevant Orders    Hemoglobin A1C    Class 3 severe obesity due to excess calories with serious comorbidity and body mass index (BMI) of 40.0 to 44.9 in adult (HCC)    Presence of drug-eluting stent in anterior descending branch of left coronary artery    Relevant Orders    Comprehensive metabolic panel    Lipid Panel with Direct LDL reflex         Subjective:      Patient ID: Elizabeth Yang is a 68 y.o. female.    Patient presents today for follow-up of chronic health issues.  Fortunately, she seems to be doing quite well.  She remains active but is not exercising routinely.  She did have a fall back in November after she tripped over her lawnmower.  She had no syncope.  She has had no further issues with falling and does not feel she is at high risk for recurrent falls.  She does have significant degenerative arthritis of her left knee.  She has a lot of pain when she initiates ambulation.  The knee does seem like it is going to give out on her on occasion when she is walking down the steps.        The following portions of the patient's history were reviewed  and updated as appropriate: allergies, current medications, past family history, past medical history, past social history, past surgical history and problem list.      Current Outpatient Medications:   •  aspirin 81 MG tablet, Take by mouth, Disp: , Rfl:   •  Calcium Carb-Cholecalciferol 600-800 MG-UNIT TABS, Take by mouth, Disp: , Rfl:   •  Flaxseed, Linseed, (FLAX SEED OIL) 1300 MG CAPS, Take by mouth, Disp: , Rfl:   •  metoprolol succinate (TOPROL-XL) 25 mg 24 hr tablet, TAKE 1 TABLET BY MOUTH EVERY DAY, Disp: 90 tablet, Rfl: 3  •  nitroglycerin (Nitrostat) 0.4 mg SL tablet, Place 1 tablet (0.4 mg total) under the tongue every 5 (five) minutes as needed (chest pain), Disp: 30 tablet, Rfl: 1  •  rosuvastatin (CRESTOR) 20 MG tablet, Take 1 tablet (20 mg total) by mouth daily, Disp: 90 tablet, Rfl: 3     Review of Systems   Constitutional:  Negative for chills, fatigue and fever.   Respiratory:  Negative for cough and shortness of breath.    Cardiovascular:  Negative for chest pain, palpitations and leg swelling.   Gastrointestinal:  Negative for abdominal pain, constipation and diarrhea.   Musculoskeletal:  Positive for arthralgias.   Skin:  Negative for rash.   Neurological:  Negative for dizziness.         Objective:      /72 (BP Location: Left arm, Patient Position: Sitting, Cuff Size: Large)   Pulse 70   Wt 107 kg (235 lb)   SpO2 98%   BMI 42.98 kg/m²          Physical Exam  Vitals reviewed.   Constitutional:       General: She is not in acute distress.     Appearance: She is well-developed. She is obese. She is not diaphoretic.   HENT:      Head: Normocephalic.   Eyes:      General:         Right eye: No discharge.         Left eye: No discharge.      Pupils: Pupils are equal, round, and reactive to light.   Neck:      Thyroid: No thyromegaly.      Trachea: No tracheal deviation.   Cardiovascular:      Rate and Rhythm: Normal rate and regular rhythm.      Heart sounds: Normal heart sounds. No murmur  heard.  Pulmonary:      Effort: Pulmonary effort is normal. No respiratory distress.      Breath sounds: No wheezing or rales.   Abdominal:      General: There is no distension.      Palpations: Abdomen is soft.      Tenderness: There is no abdominal tenderness.   Musculoskeletal:         General: Normal range of motion.      Right lower leg: No edema.      Left lower leg: No edema.   Lymphadenopathy:      Cervical: No cervical adenopathy.   Skin:     General: Skin is warm.      Findings: No erythema.   Neurological:      General: No focal deficit present.      Mental Status: She is alert and oriented to person, place, and time.      Gait: Gait normal.   Psychiatric:         Thought Content: Thought content normal.         Judgment: Judgment normal.           Edenilson Rosario Jr, MD

## 2024-04-08 ENCOUNTER — OFFICE VISIT (OUTPATIENT)
Dept: OBGYN CLINIC | Facility: MEDICAL CENTER | Age: 69
End: 2024-04-08
Payer: COMMERCIAL

## 2024-04-08 VITALS
HEIGHT: 62 IN | DIASTOLIC BLOOD PRESSURE: 81 MMHG | WEIGHT: 237 LBS | SYSTOLIC BLOOD PRESSURE: 152 MMHG | BODY MASS INDEX: 43.61 KG/M2 | HEART RATE: 67 BPM

## 2024-04-08 DIAGNOSIS — M17.12 PRIMARY OSTEOARTHRITIS OF LEFT KNEE: ICD-10-CM

## 2024-04-08 DIAGNOSIS — G89.29 CHRONIC PAIN OF LEFT KNEE: Primary | ICD-10-CM

## 2024-04-08 DIAGNOSIS — M25.562 CHRONIC PAIN OF LEFT KNEE: Primary | ICD-10-CM

## 2024-04-08 PROCEDURE — 20610 DRAIN/INJ JOINT/BURSA W/O US: CPT | Performed by: EMERGENCY MEDICINE

## 2024-04-08 PROCEDURE — 99203 OFFICE O/P NEW LOW 30 MIN: CPT | Performed by: EMERGENCY MEDICINE

## 2024-04-08 RX ORDER — TRIAMCINOLONE ACETONIDE 40 MG/ML
40 INJECTION, SUSPENSION INTRA-ARTICULAR; INTRAMUSCULAR
Status: COMPLETED | OUTPATIENT
Start: 2024-04-08 | End: 2024-04-08

## 2024-04-08 RX ORDER — LIDOCAINE HYDROCHLORIDE 10 MG/ML
4 INJECTION, SOLUTION INFILTRATION; PERINEURAL
Status: COMPLETED | OUTPATIENT
Start: 2024-04-08 | End: 2024-04-08

## 2024-04-08 RX ADMIN — LIDOCAINE HYDROCHLORIDE 4 ML: 10 INJECTION, SOLUTION INFILTRATION; PERINEURAL at 15:30

## 2024-04-08 RX ADMIN — TRIAMCINOLONE ACETONIDE 40 MG: 40 INJECTION, SUSPENSION INTRA-ARTICULAR; INTRAMUSCULAR at 15:30

## 2024-04-08 NOTE — PATIENT INSTRUCTIONS
You may take Tylenol 500mg every 4-6 hours as needed OR max 1,000mg per dose up to 3 times per day for a total of 3,000mg per day      In order to minimize further degenerative changes and pain from arthritis we recommend maintaining an active lifestyle and continually trying to maintain a healthy weight / BMI (Body Mass Index).  If you are interested we can refer you to Weight Management to help with weight loss.  I recommended avoiding any tobacco use.  On rainy days and cold days you may have worsening pain than baseline.    [email or web address] for more information     Steroid Joint Injection   AMBULATORY CARE:   What you need to know about steroid joint injection:  A steroid joint injection is a procedure to inject steroid medicine into a joint. Steroid medicine decreases pain and inflammation. The injection may also contain an anesthetic (numbing medicine) to decrease pain. It may be done to treat conditions such as arthritis, gout, or carpal tunnel syndrome. The injections may be given in your knee, ankle, shoulder, elbow, wrist, or ankle.   How to prepare for steroid joint injection:  Your healthcare provider will talk to you about how to prepare for this procedure. He will tell you what medicines to take or not take on the day of your procedure. You may need to stop taking blood thinners several days before your procedure.   What will happen during steroid joint injection:  You may be given local anesthesia to numb the area where the injection will be given. With local anesthesia, you may still feel pressure during the procedure, but you should not feel any pain. Your healthcare provider may use ultrasound or fluoroscopy (a type of x-ray) to guide the needle to the right area. He will then inject the steroid into your joint. A bandage will be placed on the injection site.   What will happen after steroid joint injection:  You may have redness, warmth, or sweating in your face and chest right after the  steroid injection. Steroids can affect blood sugar levels. If you have diabetes, you should check your blood sugars closely in the first 24 hours after your procedure.   Risks of steroid joint injection:  You may get an infection in your joint. The injection may also cause more pain during the first 24 to 36 hours. You may need more than one injection to feel pain relief. The skin near the injection site may be damaged and become discolored or indented. This can happen if the steroid is placed too close to your skin. A tendon near the injection site may rupture or a nerve can be damaged.  Contact your healthcare provider if:   You have fever or chills.     You have redness or swelling in the injection site.     You have more pain than usual in your joint for more than 72 hours.     You have questions or concerns about your condition or care.  Medicines:   Pain medicine  may be given. Ask how to take this medicine safely.     Take your medicine as directed.  Contact your healthcare provider if you think your medicine is not helping or if you have side effects. Tell him or her if you are allergic to any medicine. Keep a list of the medicines, vitamins, and herbs you take. Include the amounts, and when and why you take them. Bring the list or the pill bottles to follow-up visits. Carry your medicine list with you in case of an emergency.  Self-care:   Leave the bandage on for 8 to 12 hours.  Care for your wound as directed.    Rest the area  as directed. You may need to decrease weight on certain joints, such as the knee, for a period of time. Ask when you can return to your daily activities.     Elevate  your limb where the steroid injection was given. Elevate the limb above the level of your heart as often as you can. This will help decrease swelling and pain. Prop your limb on pillows or blankets to keep it elevated comfortably.     Apply ice  on your joint for 15 to 20 minutes every hour or as directed. Use an ice  pack, or put crushed ice in a plastic bag. Cover it with a towel. Ice helps prevent tissue damage and decreases swelling and pain.  Follow up with your healthcare provider as directed:  Write down your questions so you remember to ask them during your visits.   © 2017 [employer] Information is for End User's use only and may not be sold, redistributed or otherwise used for commercial purposes. All illustrations and images included in CareNotes® are the copyrighted property of [employer] or [employer].  The above information is an  only. It is not intended as medical advice for individual conditions or treatments. Talk to your doctor, nurse or pharmacist before following any medical regimen to see if it is safe and effective for you.      Arthritis   AMBULATORY CARE:   Arthritis  is a disease that causes inflammation in one or more joints. There are many types of arthritis, such as osteoarthritis, rheumatoid arthritis, and septic arthritis. Some types cause inflammation in the joints. Other types wear away the cartilage between joints. This makes the bones of the joint rub together when you move the joint. Your symptoms may be constant, or symptoms may come and go. Arthritis often gets worse over time and can cause permanent joint damage.  Common signs and symptoms of arthritis:   Pain, swelling, or stiffness in the joint    Limited range of motion in the joint    Warmth or redness over the joint    Tenderness when you touch the joint    Stiff joints in the morning that loosen with movement    A creaking or grinding sound when you move the joint    Fever  Seek care immediately if:   You have a fever and severe joint pain or swelling.     You cannot move the affected joint.     You have severe joint pain you cannot tolerate.  Contact your healthcare provider if:   Your pain or swelling does not get better with treatment.    You have questions or concerns about your  condition or care.  Treatment  will depend on the type of arthritis you have and if it is severe. You may need any of the following:  Acetaminophen  decreases pain and fever. It is available without a doctor's order. Ask how much to take and how often to take it. Follow directions. Acetaminophen can cause liver damage if not taken correctly.    NSAIDs , such as ibuprofen, help decrease swelling, pain, and fever. This medicine is available with or without a doctor's order. NSAIDs can cause stomach bleeding or kidney problems in certain people. If you take blood thinner medicine, always ask your healthcare provider if NSAIDs are safe for you. Always read the medicine label and follow directions.    Steroid medicine  helps reduce swelling and pain.     Surgery  may be needed to repair or replace a damaged joint.  Manage arthritis:   Rest your painful joint so it can heal.  Your healthcare provider may recommend crutches or a walker if the affected joint is in a leg.     Apply ice or heat to the joint.  Both can help decrease swelling and pain. Ice may also help prevent tissue damage. Use an ice pack, or put crushed ice in a plastic bag. Cover it with a towel and place it on your joint for 15 to 20 minutes every hour or as directed. You can apply heat for 20 minutes every 2 hours. Heat treatment includes hot packs or heat lamps.    Elevate your joint.  Elevation helps reduce swelling and pain. Raise your joint above the level of your heart as often as you can. Prop your painful joint on pillows to keep it above your heart comfortably.    Go to physical or occupational therapy as directed.  A physical therapist can teach you exercises to improve flexibility and range of motion. You may also be shown non-weight-bearing exercises that are safe for your joints, such as swimming. Exercise can help keep your joints flexible and reduce pain. An occupational therapist can help you learn to do your daily activities when your  joints are stiff or sore.    Maintain a healthy weight.  Extra weight puts increased pressure on your joints. Ask your healthcare provider what you should weigh. If you need to lose weight, he can help you create a weight loss program. Weight loss can help reduce pain and increase your ability to do your activities. The amount of exercise you do may vary each day, depending on your symptoms.    Wear flat or low-heeled shoes.  This will help decrease pain and reduce pressure on your ankle, knee, and hip joints.    Use support devices as directed.  You may be given splints to wear on your hands to help your joints rest and to decrease inflammation. While you sleep, use a pillow that is firm enough to support your neck and head.  Other equipment  that may help you move and prevent falls:  Orthotic shoes or insoles  help support your feet when you walk.    Crutches, a cane, or a walker  may help decrease your risk for falling. They also decrease stress on affected joints.     Devices to prevent falls  include raised toilet seats and bathtub bars to help you get up from sitting. Handrails can be placed in areas where you need balance and support.  Follow up with your healthcare provider or rheumatologist as directed:  Write down your questions so you remember to ask them during your visits.  © 2017 [employer] Information is for End User's use only and may not be sold, redistributed or otherwise used for commercial purposes. All illustrations and images included in CareNotes® are the copyrighted property of [employer], [employer]. or [employer].  The above information is an  only. It is not intended as medical advice for individual conditions or treatments. Talk to your doctor, nurse or pharmacist before following any medical regimen to see if it is safe and effective for you.

## 2024-04-08 NOTE — PROGRESS NOTES
Assessment/Plan:    Diagnoses and all orders for this visit:    Chronic pain of left knee  -     Large joint arthrocentesis: L knee    Primary osteoarthritis of left knee  -     Ambulatory referral to Orthopedic Surgery  -     Large joint arthrocentesis: L knee    CSI provided today for severe arthritis  Discussed visco  Reviewed prior Xrays    Return in about 3 months (around 7/8/2024).      Subjective:   Patient ID: Elizabeth Yang is a 68 y.o. female.    New patient presents for left anterior knee pain ongoing chronically aggravated by a fall in her driveway in November.  She has noticed improved symptoms since onset where she did have bruising x-rays of the left knee were obtained showing severe osteoarthritis.        Review of Systems    The following portions of the patient's chart were reviewed and updated as appropriate:   Allergy:    Allergies   Allergen Reactions    Lisinopril Cough    Epinephrine Nausea Only and Palpitations     Other reaction(s): Nausea and/or vomiting  Other reaction(s): Palpitations  Other reaction(s): Palpitations       Medications:    Current Outpatient Medications:     aspirin 81 MG tablet, Take by mouth, Disp: , Rfl:     Calcium Carb-Cholecalciferol 600-800 MG-UNIT TABS, Take by mouth, Disp: , Rfl:     Flaxseed, Linseed, (FLAX SEED OIL) 1300 MG CAPS, Take by mouth, Disp: , Rfl:     metoprolol succinate (TOPROL-XL) 25 mg 24 hr tablet, TAKE 1 TABLET BY MOUTH EVERY DAY, Disp: 90 tablet, Rfl: 3    nitroglycerin (Nitrostat) 0.4 mg SL tablet, Place 1 tablet (0.4 mg total) under the tongue every 5 (five) minutes as needed (chest pain), Disp: 30 tablet, Rfl: 1    rosuvastatin (CRESTOR) 20 MG tablet, Take 1 tablet (20 mg total) by mouth daily, Disp: 90 tablet, Rfl: 3    Patient Active Problem List   Diagnosis    ASCVD (arteriosclerotic cardiovascular disease)    Hypercholesterolemia    Hyperglycemia    Essential hypertension    Class 3 severe obesity due to excess calories with serious  "comorbidity and body mass index (BMI) of 40.0 to 44.9 in adult (HCC)    Obstructive sleep apnea syndrome    Trigger middle finger of left hand    Presence of drug-eluting stent in anterior descending branch of left coronary artery    Osteoarthritis of left knee       Objective:  /81   Pulse 67   Ht 5' 2\" (1.575 m)   Wt 108 kg (237 lb)   BMI 43.35 kg/m²     Left Knee Exam     Tenderness   The patient is experiencing no tenderness.     Range of Motion   The patient has normal left knee ROM.    Other   Erythema: absent  Sensation: normal  Swelling: mild  Effusion: effusion present          Observations   Left Knee   Positive for effusion.       Physical Exam  Musculoskeletal:      Left knee: Effusion present.           Neurologic Exam    Large joint arthrocentesis: L knee  Universal Protocol:  Consent: Verbal consent obtained.  Risks and benefits: risks, benefits and alternatives were discussed  Consent given by: patient  Time out: Immediately prior to procedure a \"time out\" was called to verify the correct patient, procedure, equipment, support staff and site/side marked as required.  Timeout called at: 4/8/2024 3:39 PM.  Patient understanding: patient states understanding of the procedure being performed  Test results: test results available and properly labeled  Site marked: the operative site was marked  Patient identity confirmed: verbally with patient  Supporting Documentation  Indications: pain   Procedure Details  Location: knee - L knee  Preparation: Patient was prepped and draped in the usual sterile fashion  Needle size: 22 G  Ultrasound guidance: no  Approach: anterolateral  Medications administered: 4 mL lidocaine 1 %; 40 mg triamcinolone acetonide 40 mg/mL    Patient tolerance: patient tolerated the procedure well with no immediate complications  Dressing:  Sterile dressing applied    No erythema of knee(s)      More recent x-ray obtained in November shows:  I have personally reviewed the " written report of the pertinent studies.   2023  XR knee 4+ vw left injury  Order: [id number]  Impression    IMPRESSION:  Severe tricompartmental osteoarthritis. No acute fracture or dislocation.               Past Medical History:   Diagnosis Date    Angina of effort 2022    Avulsion fracture     Last Assessed:10/21/2014    Cellulitis of ankle     Last Assessed:2014    Chronic fatigue     Last Assessed:2015    Edema     Last Assessed:2014    Hypertensive heart disease without congestive heart failure 2012      Formatting of this note might be different from the original.  Formatting of this note might be different from the original. Formatting of this note might be different from the original. Images from the original note were not included.  Formatting of this note might be different from the original.  Last Assessment & Plan:  Formatting of this note might be different from the original. Stable at t    Plantar fasciitis     Last Assessed:2013    Trigger middle finger of right hand     Last Assessed:2014       Past Surgical History:   Procedure Laterality Date    BREAST BIOPSY Right 2009    ultrasound guided core biopsy-benign    CARDIAC CATHETERIZATION      COLONOSCOPY          CORONARY ANGIOPLASTY WITH STENT PLACEMENT      CORONARY ANGIOPLASTY WITH STENT PLACEMENT          [facility]      1988       Social History     Socioeconomic History    Marital status: Single     Spouse name: Not on file    Number of children: Not on file    Years of education: Not on file    Highest education level: Not on file   Occupational History    Not on file   Tobacco Use    Smoking status: Former     Current packs/day: 0.00     Types: Cigarettes     Quit date: 1994     Years since quittin.2    Smokeless tobacco: Never   Vaping Use    Vaping status: Never Used   Substance and Sexual Activity    Alcohol use: Yes     Alcohol/week: 2.0 - 3.0 standard drinks of alcohol     Types: 2  - 3 Standard drinks or equivalent per week    Drug use: Never    Sexual activity: Not Currently   Other Topics Concern    Not on file   Social History Narrative    Not on file     Social Determinants of Health     Financial Resource Strain: Not on file   Food Insecurity: Not on file   Transportation Needs: Not on file   Physical Activity: Not on file   Stress: Medium Risk (4/13/2021)    Received from [employer]    Stress     Stress in your Life: Not on file     Dealing with Stress: Not on file   Social Connections: Not on file   Intimate Partner Violence: Low Risk  (4/13/2021)    Received from [facility]    Intimate Partner Violence     Insults You: Not on file     Threatens You: Not on file     Screams at You: Not on file     Physically Hurt: Not on file     Intimate Partner Violence Score: Not on file   Housing Stability: Not on file       Family History   Problem Relation Age of Onset    Other Mother         angina pectoris  and septicemia    Diabetes Mother     Angina Mother     Heart disease Mother     Cancer Father         laryngeal cancer    No Known Problems Sister     No Known Problems Maternal Grandmother     No Known Problems Maternal Grandfather     No Known Problems Paternal Grandmother     No Known Problems Paternal Grandfather     No Known Problems Brother     Breast cancer Maternal Aunt 83    Breast cancer Maternal Aunt 48    Bone cancer Maternal Aunt 68    Breast cancer Paternal Aunt 50

## 2024-06-10 DIAGNOSIS — E78.00 HYPERCHOLESTEROLEMIA: ICD-10-CM

## 2024-06-10 DIAGNOSIS — I25.10 ASCVD (ARTERIOSCLEROTIC CARDIOVASCULAR DISEASE): ICD-10-CM

## 2024-06-10 RX ORDER — METOPROLOL SUCCINATE 25 MG/1
TABLET, EXTENDED RELEASE ORAL
Qty: 90 TABLET | Refills: 1 | Status: SHIPPED | OUTPATIENT
Start: 2024-06-10

## 2024-07-17 ENCOUNTER — OFFICE VISIT (OUTPATIENT)
Dept: OBGYN CLINIC | Facility: MEDICAL CENTER | Age: 69
End: 2024-07-17
Payer: COMMERCIAL

## 2024-07-17 VITALS
HEIGHT: 62 IN | DIASTOLIC BLOOD PRESSURE: 88 MMHG | HEART RATE: 68 BPM | SYSTOLIC BLOOD PRESSURE: 138 MMHG | WEIGHT: 241 LBS | BODY MASS INDEX: 44.35 KG/M2

## 2024-07-17 DIAGNOSIS — G89.29 CHRONIC PAIN OF LEFT KNEE: Primary | ICD-10-CM

## 2024-07-17 DIAGNOSIS — M25.562 CHRONIC PAIN OF LEFT KNEE: Primary | ICD-10-CM

## 2024-07-17 DIAGNOSIS — M17.12 PRIMARY OSTEOARTHRITIS OF LEFT KNEE: ICD-10-CM

## 2024-07-17 PROCEDURE — 20610 DRAIN/INJ JOINT/BURSA W/O US: CPT | Performed by: EMERGENCY MEDICINE

## 2024-07-17 PROCEDURE — 99212 OFFICE O/P EST SF 10 MIN: CPT | Performed by: EMERGENCY MEDICINE

## 2024-07-17 RX ORDER — TRIAMCINOLONE ACETONIDE 40 MG/ML
40 INJECTION, SUSPENSION INTRA-ARTICULAR; INTRAMUSCULAR
Status: COMPLETED | OUTPATIENT
Start: 2024-07-17 | End: 2024-07-17

## 2024-07-17 RX ORDER — LIDOCAINE HYDROCHLORIDE 10 MG/ML
4 INJECTION, SOLUTION EPIDURAL; INFILTRATION; INTRACAUDAL; PERINEURAL
Status: COMPLETED | OUTPATIENT
Start: 2024-07-17 | End: 2024-07-17

## 2024-07-17 RX ADMIN — LIDOCAINE HYDROCHLORIDE 4 ML: 10 INJECTION, SOLUTION EPIDURAL; INFILTRATION; INTRACAUDAL; PERINEURAL at 09:15

## 2024-07-17 RX ADMIN — TRIAMCINOLONE ACETONIDE 40 MG: 40 INJECTION, SUSPENSION INTRA-ARTICULAR; INTRAMUSCULAR at 09:15

## 2024-07-17 NOTE — PROGRESS NOTES
Assessment/Plan:    Diagnoses and all orders for this visit:    Chronic pain of left knee  -     Large joint arthrocentesis: L knee  -     lidocaine (PF) (XYLOCAINE-MPF) 1 % injection 4 mL  -     triamcinolone acetonide (KENALOG-40) 40 mg/mL injection 40 mg    Primary osteoarthritis of left knee  -     Large joint arthrocentesis: L knee  -     lidocaine (PF) (XYLOCAINE-MPF) 1 % injection 4 mL  -     triamcinolone acetonide (KENALOG-40) 40 mg/mL injection 40 mg    Given severity of OA, t/c Visco which we discussed  Also discussed TKA and BMI requirement    Return in about 3 months (around 10/17/2024).      Subjective:   Patient ID: Elizabeth Yang is a 68 y.o. female.    Elizabeth returns for Left knee pain had great benefit from CSI up until recently    Initial note:  New patient presents for left anterior knee pain ongoing chronically aggravated by a fall in her driveway in November.  She has noticed improved symptoms since onset where she did have bruising x-rays of the left knee were obtained showing severe osteoarthritis.      Review of Systems    The following portions of the patient's chart were reviewed and updated as appropriate:   Allergy:    Allergies   Allergen Reactions    Lisinopril Cough    Epinephrine Nausea Only and Palpitations     Other reaction(s): Nausea and/or vomiting  Other reaction(s): Palpitations  Other reaction(s): Palpitations       Medications:    Current Outpatient Medications:     aspirin 81 MG tablet, Take by mouth, Disp: , Rfl:     Calcium Carb-Cholecalciferol 600-800 MG-UNIT TABS, Take by mouth, Disp: , Rfl:     Flaxseed, Linseed, (FLAX SEED OIL) 1300 MG CAPS, Take by mouth, Disp: , Rfl:     metoprolol succinate (TOPROL-XL) 25 mg 24 hr tablet, TAKE 1 TABLET BY MOUTH EVERY DAY, Disp: 90 tablet, Rfl: 1    nitroglycerin (Nitrostat) 0.4 mg SL tablet, Place 1 tablet (0.4 mg total) under the tongue every 5 (five) minutes as needed (chest pain), Disp: 30 tablet, Rfl: 1    rosuvastatin  "(CRESTOR) 20 MG tablet, Take 1 tablet (20 mg total) by mouth daily, Disp: 90 tablet, Rfl: 3  No current facility-administered medications for this visit.    Patient Active Problem List   Diagnosis    ASCVD (arteriosclerotic cardiovascular disease)    Hypercholesterolemia    Hyperglycemia    Essential hypertension    Class 3 severe obesity due to excess calories with serious comorbidity and body mass index (BMI) of 40.0 to 44.9 in adult (HCC)    Obstructive sleep apnea syndrome    Trigger middle finger of left hand    Presence of drug-eluting stent in anterior descending branch of left coronary artery    Osteoarthritis of left knee       Objective:  /88   Pulse 68   Ht 5' 2\" (1.575 m)   Wt 109 kg (241 lb)   BMI 44.08 kg/m²     Left Knee Exam     Other   Erythema: absent            Physical Exam      Neurologic Exam    Large joint arthrocentesis: L knee  Universal Protocol:  Consent: Verbal consent obtained.  Risks and benefits: risks, benefits and alternatives were discussed  Consent given by: patient  Time out: Immediately prior to procedure a \"time out\" was called to verify the correct patient, procedure, equipment, support staff and site/side marked as required.  Timeout called at: 7/17/2024 9:24 AM.  Patient understanding: patient states understanding of the procedure being performed  Test results: test results available and properly labeled  Site marked: the operative site was marked  Patient identity confirmed: verbally with patient  Supporting Documentation  Indications: pain   Procedure Details  Location: knee - L knee  Preparation: Patient was prepped and draped in the usual sterile fashion  Needle size: 22 G  Ultrasound guidance: no  Approach: anterolateral  Medications administered: 40 mg triamcinolone acetonide 40 mg/mL; 4 mL lidocaine (PF) 1 %    Patient tolerance: patient tolerated the procedure well with no immediate complications  Dressing:  Sterile dressing applied    No erythema of " knee(s)          I have personally reviewed the written report of the pertinent studies.             Past Medical History:   Diagnosis Date    Angina of effort 2022    Avulsion fracture     Last Assessed:10/21/2014    Cellulitis of ankle     Last Assessed:2014    Chronic fatigue     Last Assessed:2015    Edema     Last Assessed:2014    Hypertensive heart disease without congestive heart failure 2012      Formatting of this note might be different from the original.  Formatting of this note might be different from the original. Formatting of this note might be different from the original. Images from the original note were not included.  Formatting of this note might be different from the original.  Last Assessment & Plan:  Formatting of this note might be different from the original. Stable at t    Plantar fasciitis     Last Assessed:2013    Trigger middle finger of right hand     Last Assessed:2014       Past Surgical History:   Procedure Laterality Date    BREAST BIOPSY Right 2009    ultrasound guided core biopsy-benign    CARDIAC CATHETERIZATION      COLONOSCOPY          CORONARY ANGIOPLASTY WITH STENT PLACEMENT      CORONARY ANGIOPLASTY WITH STENT PLACEMENT          [place] [id number]       Social History     Socioeconomic History    Marital status: Single     Spouse name: Not on file    Number of children: Not on file    Years of education: Not on file    Highest education level: Not on file   Occupational History    Not on file   Tobacco Use    Smoking status: Former     Current packs/day: 0.00     Types: Cigarettes     Quit date: 1994     Years since quittin.5    Smokeless tobacco: Never   Vaping Use    Vaping status: Never Used   Substance and Sexual Activity    Alcohol use: Yes     Alcohol/week: 2.0 - 3.0 standard drinks of alcohol     Types: 2 - 3 Standard drinks or equivalent per week    Drug use: Never    Sexual activity: Not Currently   Other Topics  Concern    Not on file   Social History Narrative    Not on file     Social Determinants of Health     Financial Resource Strain: Not on file   Food Insecurity: Not on file   Transportation Needs: Not on file   Physical Activity: Not on file   Stress: Medium Risk (4/13/2021)    Received from [facility]    Stress     Stress in your Life: Not on file     Dealing with Stress: Not on file   Social Connections: Not on file   Intimate Partner Violence: Low Risk  (4/13/2021)    Received from [facility]    Intimate Partner Violence     Insults You: Not on file     Threatens You: Not on file     Screams at You: Not on file     Physically Hurt: Not on file     Intimate Partner Violence Score: Not on file   Housing Stability: Not on file       Family History   Problem Relation Age of Onset    Other Mother         angina pectoris  and septicemia    Diabetes Mother     Angina Mother     Heart disease Mother     Cancer Father         laryngeal cancer    No Known Problems Sister     No Known Problems Maternal Grandmother     No Known Problems Maternal Grandfather     No Known Problems Paternal Grandmother     No Known Problems Paternal Grandfather     No Known Problems Brother     Breast cancer Maternal Aunt 83    Breast cancer Maternal Aunt 48    Bone cancer Maternal Aunt 68    Breast cancer Paternal Aunt 50

## 2024-09-03 LAB — HBA1C MFR BLD HPLC: 5.6 %

## 2024-09-11 ENCOUNTER — RA CDI HCC (OUTPATIENT)
Dept: OTHER | Facility: HOSPITAL | Age: 69
End: 2024-09-11

## 2024-09-17 ENCOUNTER — OFFICE VISIT (OUTPATIENT)
Dept: FAMILY MEDICINE CLINIC | Facility: CLINIC | Age: 69
End: 2024-09-17
Payer: COMMERCIAL

## 2024-09-17 VITALS
OXYGEN SATURATION: 98 % | DIASTOLIC BLOOD PRESSURE: 80 MMHG | SYSTOLIC BLOOD PRESSURE: 130 MMHG | HEART RATE: 60 BPM | HEIGHT: 63 IN | WEIGHT: 240.6 LBS | BODY MASS INDEX: 42.63 KG/M2

## 2024-09-17 DIAGNOSIS — Z23 NEED FOR VACCINATION: ICD-10-CM

## 2024-09-17 DIAGNOSIS — Z95.5 PRESENCE OF DRUG-ELUTING STENT IN ANTERIOR DESCENDING BRANCH OF LEFT CORONARY ARTERY: ICD-10-CM

## 2024-09-17 DIAGNOSIS — Z86.010 HISTORY OF COLON POLYPS: ICD-10-CM

## 2024-09-17 DIAGNOSIS — E78.00 HYPERCHOLESTEROLEMIA: ICD-10-CM

## 2024-09-17 DIAGNOSIS — R73.9 HYPERGLYCEMIA: ICD-10-CM

## 2024-09-17 DIAGNOSIS — G47.33 OBSTRUCTIVE SLEEP APNEA SYNDROME: ICD-10-CM

## 2024-09-17 DIAGNOSIS — I10 ESSENTIAL HYPERTENSION: ICD-10-CM

## 2024-09-17 DIAGNOSIS — I25.10 ASCVD (ARTERIOSCLEROTIC CARDIOVASCULAR DISEASE): ICD-10-CM

## 2024-09-17 DIAGNOSIS — M65.332 TRIGGER MIDDLE FINGER OF LEFT HAND: ICD-10-CM

## 2024-09-17 DIAGNOSIS — M17.12 PRIMARY OSTEOARTHRITIS OF LEFT KNEE: ICD-10-CM

## 2024-09-17 DIAGNOSIS — Z00.00 WELL ADULT ON ROUTINE HEALTH CHECK: Primary | ICD-10-CM

## 2024-09-17 PROBLEM — Z86.0100 HISTORY OF COLON POLYPS: Status: ACTIVE | Noted: 2024-09-17

## 2024-09-17 PROCEDURE — 90480 ADMN SARSCOV2 VAC 1/ONLY CMP: CPT

## 2024-09-17 PROCEDURE — 99214 OFFICE O/P EST MOD 30 MIN: CPT | Performed by: FAMILY MEDICINE

## 2024-09-17 PROCEDURE — 99397 PER PM REEVAL EST PAT 65+ YR: CPT | Performed by: FAMILY MEDICINE

## 2024-09-17 PROCEDURE — 91320 SARSCV2 VAC 30MCG TRS-SUC IM: CPT

## 2024-09-17 NOTE — ASSESSMENT & PLAN NOTE
Check A1c prior to follow-up  Orders:    CBC and differential; Future    Comprehensive metabolic panel; Future    Hemoglobin A1C; Future

## 2024-09-17 NOTE — ASSESSMENT & PLAN NOTE
Orders:    Comprehensive metabolic panel; Future    Lipid Panel with Direct LDL reflex; Future

## 2024-09-17 NOTE — PROGRESS NOTES
Adult Annual Physical  Name: Elizabeth Yang      : 1955      MRN: [id number]  Encounter Provider: Edenilson Rosario Jr, MD  Encounter Date: 2024   Encounter department: [facility]    Assessment & Plan  Well adult on routine health check  She presented today for an annual physical.  Fortunately, she doing quite well.  She is due for a COVID shot which will be given tonight.  She will get a flu shot at her pharmacy.  She is up-to-date on colonoscopy and will be getting her mammogram in November.       History of colon polyps  Repeat colonoscopy in         ASCVD (arteriosclerotic cardiovascular disease)  She remains quite stable.  Continue follow-up with us as well as cardiology.  Risk factors are very well-managed.  Orders:    CBC and differential; Future    Comprehensive metabolic panel; Future    Lipid Panel with Direct LDL reflex; Future    Essential hypertension  Blood pressure is very well-controlled.  Orders:    CBC and differential; Future    Comprehensive metabolic panel; Future    Obstructive sleep apnea syndrome  She remains compliant with CPAP       Hyperglycemia  Check A1c prior to follow-up  Orders:    CBC and differential; Future    Comprehensive metabolic panel; Future    Hemoglobin A1C; Future    Hypercholesterolemia    Orders:    Comprehensive metabolic panel; Future    Lipid Panel with Direct LDL reflex; Future    Trigger middle finger of left hand         Primary osteoarthritis of left knee         Presence of drug-eluting stent in anterior descending branch of left coronary artery         Need for vaccination    Orders:    COVID-19 Pfizer mRNA vaccine 12 yr and older (Comirnaty pre-filled syringe)    Immunizations and preventive care screenings were discussed with patient today. Appropriate education was printed on patient's after visit summary.    Counseling:  Alcohol/drug use: discussed moderation in alcohol intake, the recommendations for healthy  alcohol use, and avoidance of illicit drug use.  Dental Health: discussed importance of regular tooth brushing, flossing, and dental visits.  Exercise: the importance of regular exercise/physical activity was discussed. Recommend exercise 3-5 times per week for at least 30 minutes.       Depression Screening and Follow-up Plan: Patient was screened for depression during today's encounter. They screened negative with a PHQ-2 score of 0.        History of Present Illness patient presents today for an annual physical.  Fortunately, she doing quite well.  She is planning on retiring on December 2.  She has a history of coronary artery disease but denies any present chest pain, shortness of breath or palpitations.  Recent labs look good.  There is a history of some mild hyperglycemia and hyperlipidemia which are well-controlled at this time.      Adult Annual Physical:  Patient presents for annual physical.     Diet and Physical Activity:  - Diet/Nutrition: well balanced diet.  - Exercise: walking.    Depression Screening:  - PHQ-2 Score: 0    General Health:  - Sleep: sleeps well.  - Hearing: normal hearing right ear and normal hearing left ear.  - Vision: goes for regular eye exams.  - Dental: regular dental visits.    /GYN Health:  - Follows with GYN: yes.   - History of STDs: no    Review of Systems   Constitutional:  Negative for appetite change, chills, fatigue, fever and unexpected weight change.   HENT:  Negative for trouble swallowing.    Eyes:  Negative for visual disturbance.   Respiratory:  Negative for cough, chest tightness, shortness of breath and wheezing.    Cardiovascular:  Negative for chest pain, palpitations and leg swelling.   Gastrointestinal:  Negative for abdominal distention, abdominal pain, blood in stool, constipation and diarrhea.   Endocrine: Negative for polyuria.   Genitourinary:  Negative for difficulty urinating and flank pain.   Musculoskeletal:  Negative for arthralgias and myalgias.  "  Skin:  Negative for rash.   Neurological:  Negative for dizziness and light-headedness.   Hematological:  Negative for adenopathy. Does not bruise/bleed easily.   Psychiatric/Behavioral:  Negative for dysphoric mood and sleep disturbance. The patient is not nervous/anxious.          Objective     /89   Pulse 60   Ht 5' 3\" (1.6 m)   Wt 109 kg (240 lb 9.6 oz)   SpO2 98%   BMI 42.62 kg/m²     Physical Exam  Constitutional:       General: She is not in acute distress.     Appearance: She is well-developed. She is obese. She is not diaphoretic.   HENT:      Head: Normocephalic.      Right Ear: External ear normal.      Left Ear: External ear normal.      Nose: Nose normal.   Eyes:      General: No scleral icterus.        Right eye: No discharge.         Left eye: No discharge.      Conjunctiva/sclera: Conjunctivae normal.      Pupils: Pupils are equal, round, and reactive to light.   Neck:      Thyroid: No thyromegaly.      Trachea: No tracheal deviation.   Cardiovascular:      Rate and Rhythm: Normal rate and regular rhythm.      Heart sounds: Normal heart sounds. No murmur heard.  Pulmonary:      Effort: Pulmonary effort is normal. No respiratory distress.      Breath sounds: Normal breath sounds. No stridor. No wheezing, rhonchi or rales.   Abdominal:      General: Bowel sounds are normal. There is no distension.      Palpations: Abdomen is soft.      Tenderness: There is no abdominal tenderness. There is no guarding.   Musculoskeletal:         General: No tenderness or deformity. Normal range of motion.      Right lower leg: No edema.      Left lower leg: No edema.   Lymphadenopathy:      Cervical: No cervical adenopathy.   Skin:     General: Skin is warm.      Coloration: Skin is not pale.      Findings: No erythema or rash.   Neurological:      Cranial Nerves: No cranial nerve deficit.      Coordination: Coordination normal.   Psychiatric:         Mood and Affect: Mood normal.         Behavior: " Behavior normal.         Thought Content: Thought content normal.

## 2024-09-17 NOTE — ASSESSMENT & PLAN NOTE
Blood pressure is very well-controlled.  Orders:    CBC and differential; Future    Comprehensive metabolic panel; Future

## 2024-09-17 NOTE — ASSESSMENT & PLAN NOTE
She remains quite stable.  Continue follow-up with us as well as cardiology.  Risk factors are very well-managed.  Orders:    CBC and differential; Future    Comprehensive metabolic panel; Future    Lipid Panel with Direct LDL reflex; Future

## 2024-10-14 ENCOUNTER — OFFICE VISIT (OUTPATIENT)
Dept: OBGYN CLINIC | Facility: MEDICAL CENTER | Age: 69
End: 2024-10-14
Payer: COMMERCIAL

## 2024-10-14 VITALS
WEIGHT: 240 LBS | DIASTOLIC BLOOD PRESSURE: 82 MMHG | BODY MASS INDEX: 42.52 KG/M2 | HEART RATE: 64 BPM | SYSTOLIC BLOOD PRESSURE: 138 MMHG | HEIGHT: 63 IN

## 2024-10-14 DIAGNOSIS — M17.12 PRIMARY OSTEOARTHRITIS OF LEFT KNEE: ICD-10-CM

## 2024-10-14 DIAGNOSIS — G89.29 CHRONIC PAIN OF LEFT KNEE: Primary | ICD-10-CM

## 2024-10-14 DIAGNOSIS — M25.562 CHRONIC PAIN OF LEFT KNEE: Primary | ICD-10-CM

## 2024-10-14 PROCEDURE — 99212 OFFICE O/P EST SF 10 MIN: CPT | Performed by: EMERGENCY MEDICINE

## 2024-10-14 NOTE — PROGRESS NOTES
Assessment/Plan:    Diagnoses and all orders for this visit:    Chronic pain of left knee    Primary osteoarthritis of left knee    Elizabeth is much improved after her most recent CSI 7/2024.  We did discuss repeating her injection however I recommend waiting until her pain increases.  We will see her back in approximately 4 to 6 weeks in case her pain does return.  If her pain returns prior to her next appointment she is instructed to call and we can get her in ASAP for CSI    She will be retiring 12/2/2024    Return in about 4 weeks (around 11/11/2024).      Subjective:   Patient ID: Elizabeth Yang is a 68 y.o. female.    Elizabeth returns for Left knee pain had great benefit from CSI, her pain is minimal 1/10, she is able to perform ADLs.  She did have 1 episode of her right knee giving out on her after getting up from a seated position however she states that since then she has not had any other symptoms.      Initial note:  New patient presents for left anterior knee pain ongoing chronically aggravated by a fall in her driveway in November.  She has noticed improved symptoms since onset where she did have bruising x-rays of the left knee were obtained showing severe osteoarthritis.        Review of Systems    The following portions of the patient's chart were reviewed and updated as appropriate:   Allergy:    Allergies   Allergen Reactions    Lisinopril Cough    Epinephrine Nausea Only and Palpitations     Other reaction(s): Nausea and/or vomiting  Other reaction(s): Palpitations  Other reaction(s): Palpitations       Medications:    Current Outpatient Medications:     aspirin 81 MG tablet, Take by mouth, Disp: , Rfl:     Calcium Carb-Cholecalciferol 600-800 MG-UNIT TABS, Take by mouth, Disp: , Rfl:     Flaxseed, Linseed, (FLAX SEED OIL) 1300 MG CAPS, Take by mouth, Disp: , Rfl:     metoprolol succinate (TOPROL-XL) 25 mg 24 hr tablet, TAKE 1 TABLET BY MOUTH EVERY DAY, Disp: 90 tablet, Rfl: 1    rosuvastatin  "(CRESTOR) 20 MG tablet, Take 1 tablet (20 mg total) by mouth daily, Disp: 90 tablet, Rfl: 3    nitroglycerin (Nitrostat) 0.4 mg SL tablet, Place 1 tablet (0.4 mg total) under the tongue every 5 (five) minutes as needed (chest pain) (Patient not taking: Reported on 9/17/2024), Disp: 30 tablet, Rfl: 1    Patient Active Problem List   Diagnosis    ASCVD (arteriosclerotic cardiovascular disease)    Hypercholesterolemia    Hyperglycemia    Essential hypertension    Class 3 severe obesity due to excess calories with serious comorbidity and body mass index (BMI) of 40.0 to 44.9 in adult ([facility])    Obstructive sleep apnea syndrome    Trigger middle finger of left hand    Presence of drug-eluting stent in anterior descending branch of left coronary artery    Osteoarthritis of left knee    History of colon polyps       Objective:  /82   Pulse 64   Ht 5' 3\" (1.6 m)   Wt 109 kg (240 lb)   BMI 42.51 kg/m²     Ortho Exam    Physical Exam      Neurologic Exam    Procedures    I have personally reviewed the written report of the pertinent studies.             Past Medical History:   Diagnosis Date    Angina of effort ([facility]) 8/31/2022    Avulsion fracture     Last Assessed:10/21/2014    Cellulitis of ankle     Last Assessed:7/18/2014    Chronic fatigue     Last Assessed:12/1/2015    Edema     Last Assessed:7/18/2014    Hypertensive heart disease without congestive heart failure 8/1/2012      Formatting of this note might be different from the original.  Formatting of this note might be different from the original. Formatting of this note might be different from the original. Images from the original note were not included.  Formatting of this note might be different from the original.  Last Assessment & Plan:  Formatting of this note might be different from the original. Stable at t    Plantar fasciitis     Last Assessed:9/27/2013    Trigger middle finger of right hand     Last Assessed:2/19/2014       Past Surgical History: "   Procedure Laterality Date    BREAST BIOPSY Right 2009    ultrasound guided core biopsy-benign    CARDIAC CATHETERIZATION      COLONOSCOPY          CORONARY ANGIOPLASTY WITH STENT PLACEMENT      CORONARY ANGIOPLASTY WITH STENT PLACEMENT          ERCP      1988       Social History     Socioeconomic History    Marital status: Single     Spouse name: Not on file    Number of children: Not on file    Years of education: Not on file    Highest education level: Not on file   Occupational History    Not on file   Tobacco Use    Smoking status: Former     Current packs/day: 0.00     Types: Cigarettes     Quit date: 1994     Years since quittin.8    Smokeless tobacco: Never   Vaping Use    Vaping status: Never Used   Substance and Sexual Activity    Alcohol use: Yes     Alcohol/week: 2.0 - 3.0 standard drinks of alcohol     Types: 2 - 3 Standard drinks or equivalent per week    Drug use: Never    Sexual activity: Not Currently   Other Topics Concern    Not on file   Social History Narrative    Not on file     Social Determinants of Health     Financial Resource Strain: Not on file   Food Insecurity: Not on file   Transportation Needs: Not on file   Physical Activity: Not on file   Stress: Medium Risk (2021)    Received from [facility]    Stress     Stress in your Life: Not on file     Dealing with Stress: Not on file   Social Connections: Not on file   Intimate Partner Violence: Low Risk  (2021)    Received from [facility]    Intimate Partner Violence     Insults You: Not on file     Threatens You: Not on file     Screams at You: Not on file     Physically Hurt: Not on file     Intimate Partner Violence Score: Not on file   Housing Stability: Not on file       Family History   Problem Relation Age of Onset    Other Mother         angina pectoris  and septicemia    Diabetes Mother     Angina Mother     Heart disease Mother     Cancer Father          laryngeal cancer    No Known Problems Sister     No Known Problems Maternal Grandmother     No Known Problems Maternal Grandfather     No Known Problems Paternal Grandmother     No Known Problems Paternal Grandfather     No Known Problems Brother     Breast cancer Maternal Aunt 83    Breast cancer Maternal Aunt 48    Bone cancer Maternal Aunt 68    Breast cancer Paternal Aunt 50

## 2024-11-18 ENCOUNTER — HOSPITAL ENCOUNTER (OUTPATIENT)
Dept: RADIOLOGY | Age: 69
Discharge: HOME | End: 2024-11-18
Payer: COMMERCIAL

## 2024-11-18 VITALS — WEIGHT: 240 LBS | HEIGHT: 63 IN | BODY MASS INDEX: 42.52 KG/M2

## 2024-11-18 DIAGNOSIS — Z12.31 VISIT FOR SCREENING MAMMOGRAM: ICD-10-CM

## 2024-11-18 PROCEDURE — 77063 BREAST TOMOSYNTHESIS BI: CPT

## 2024-11-18 PROCEDURE — 77067 SCR MAMMO BI INCL CAD: CPT

## 2024-12-03 ENCOUNTER — OFFICE VISIT (OUTPATIENT)
Dept: OBGYN CLINIC | Facility: MEDICAL CENTER | Age: 69
End: 2024-12-03
Payer: COMMERCIAL

## 2024-12-03 VITALS
SYSTOLIC BLOOD PRESSURE: 134 MMHG | HEART RATE: 64 BPM | WEIGHT: 240 LBS | BODY MASS INDEX: 42.52 KG/M2 | HEIGHT: 63 IN | DIASTOLIC BLOOD PRESSURE: 80 MMHG

## 2024-12-03 DIAGNOSIS — M17.12 PRIMARY OSTEOARTHRITIS OF LEFT KNEE: ICD-10-CM

## 2024-12-03 DIAGNOSIS — I25.10 ASCVD (ARTERIOSCLEROTIC CARDIOVASCULAR DISEASE): ICD-10-CM

## 2024-12-03 DIAGNOSIS — E78.00 HYPERCHOLESTEROLEMIA: ICD-10-CM

## 2024-12-03 DIAGNOSIS — M25.562 CHRONIC PAIN OF LEFT KNEE: Primary | ICD-10-CM

## 2024-12-03 DIAGNOSIS — G89.29 CHRONIC PAIN OF LEFT KNEE: Primary | ICD-10-CM

## 2024-12-03 PROCEDURE — 20610 DRAIN/INJ JOINT/BURSA W/O US: CPT | Performed by: EMERGENCY MEDICINE

## 2024-12-03 PROCEDURE — 99213 OFFICE O/P EST LOW 20 MIN: CPT | Performed by: EMERGENCY MEDICINE

## 2024-12-03 RX ORDER — TRIAMCINOLONE ACETONIDE 40 MG/ML
40 INJECTION, SUSPENSION INTRA-ARTICULAR; INTRAMUSCULAR
Status: COMPLETED | OUTPATIENT
Start: 2024-12-03 | End: 2024-12-03

## 2024-12-03 RX ORDER — ROPIVACAINE HYDROCHLORIDE 2 MG/ML
4 INJECTION, SOLUTION EPIDURAL; INFILTRATION; PERINEURAL
Status: COMPLETED | OUTPATIENT
Start: 2024-12-03 | End: 2024-12-03

## 2024-12-03 RX ADMIN — TRIAMCINOLONE ACETONIDE 40 MG: 40 INJECTION, SUSPENSION INTRA-ARTICULAR; INTRAMUSCULAR at 09:15

## 2024-12-03 RX ADMIN — ROPIVACAINE HYDROCHLORIDE 4 ML: 2 INJECTION, SOLUTION EPIDURAL; INFILTRATION; PERINEURAL at 09:15

## 2024-12-03 NOTE — PROGRESS NOTES
Assessment/Plan:    Diagnoses and all orders for this visit:    Chronic pain of left knee    Primary osteoarthritis of left knee    Other orders  -     Large joint arthrocentesis    Severe OA  Repeat CSI  Discussed visco     Return in about 3 months (around 3/3/2025).      Subjective:   Patient ID: Elizabeth Yang is a 69 y.o. female.    Elizabeth returns with recurrence of left knee pain  Recently retired 12/2/24      Review of Systems    The following portions of the patient's chart were reviewed and updated as appropriate:   Allergy:    Allergies   Allergen Reactions    Lisinopril Cough    Epinephrine Nausea Only and Palpitations     Other reaction(s): Nausea and/or vomiting  Other reaction(s): Palpitations  Other reaction(s): Palpitations       Medications:    Current Outpatient Medications:     aspirin 81 MG tablet, Take by mouth, Disp: , Rfl:     Calcium Carb-Cholecalciferol 600-800 MG-UNIT TABS, Take by mouth, Disp: , Rfl:     Flaxseed, Linseed, (FLAX SEED OIL) 1300 MG CAPS, Take by mouth, Disp: , Rfl:     metoprolol succinate (TOPROL-XL) 25 mg 24 hr tablet, TAKE 1 TABLET BY MOUTH EVERY DAY, Disp: 90 tablet, Rfl: 1    rosuvastatin (CRESTOR) 20 MG tablet, Take 1 tablet (20 mg total) by mouth daily, Disp: 90 tablet, Rfl: 3    nitroglycerin (Nitrostat) 0.4 mg SL tablet, Place 1 tablet (0.4 mg total) under the tongue every 5 (five) minutes as needed (chest pain) (Patient not taking: Reported on 12/3/2024), Disp: 30 tablet, Rfl: 1    Patient Active Problem List   Diagnosis    ASCVD (arteriosclerotic cardiovascular disease)    Hypercholesterolemia    Hyperglycemia    Essential hypertension    Class 3 severe obesity due to excess calories with serious comorbidity and body mass index (BMI) of 40.0 to 44.9 in adult (HCC)    Obstructive sleep apnea syndrome    Trigger middle finger of left hand    Presence of drug-eluting stent in anterior descending branch of left coronary artery    Osteoarthritis of left knee     "History of colon polyps       Objective:  /80   Pulse 64   Ht 5' 3\" (1.6 m)   Wt 109 kg (240 lb)   BMI 42.51 kg/m²     Ortho Exam    Physical Exam      Neurologic Exam    Large joint arthrocentesis: L knee  Universal Protocol:  Consent: Verbal consent obtained.  Risks and benefits: risks, benefits and alternatives were discussed  Consent given by: patient  Time out: Immediately prior to procedure a \"time out\" was called to verify the correct patient, procedure, equipment, support staff and site/side marked as required.  Timeout called at: 12/3/2024 9:06 AM.  Patient understanding: patient states understanding of the procedure being performed  Test results: test results available and properly labeled  Site marked: the operative site was marked  Patient identity confirmed: verbally with patient  Supporting Documentation  Indications: pain   Procedure Details  Location: knee - L knee  Preparation: Patient was prepped and draped in the usual sterile fashion  Needle size: 22 G  Ultrasound guidance: no  Approach: anterolateral  Medications administered: 40 mg triamcinolone acetonide 40 mg/mL; 4 mL ropivacaine 0.2 %    Patient tolerance: patient tolerated the procedure well with no immediate complications  Dressing:  Sterile dressing applied    No erythema of knee(s)          I have personally reviewed the written report of the pertinent studies.             Past Medical History:   Diagnosis Date    Angina of effort (HCC) 8/31/2022    Avulsion fracture     Last Assessed:10/21/2014    Cellulitis of ankle     Last Assessed:7/18/2014    Chronic fatigue     Last Assessed:12/1/2015    Edema     Last Assessed:7/18/2014    Hypertensive heart disease without congestive heart failure 8/1/2012      Formatting of this note might be different from the original.  Formatting of this note might be different from the original. Formatting of this note might be different from the original. Images from the original note were not " included.  Formatting of this note might be different from the original.  Last Assessment & Plan:  Formatting of this note might be different from the original. Stable at t    Plantar fasciitis     Last Assessed:2013    Trigger middle finger of right hand     Last Assessed:2014       Past Surgical History:   Procedure Laterality Date    BREAST BIOPSY Right 2009    ultrasound guided core biopsy-benign    CARDIAC CATHETERIZATION      COLONOSCOPY          CORONARY ANGIOPLASTY WITH STENT PLACEMENT      CORONARY ANGIOPLASTY WITH STENT PLACEMENT          [facility]      1988       Social History     Socioeconomic History    Marital status: Single     Spouse name: Not on file    Number of children: Not on file    Years of education: Not on file    Highest education level: Not on file   Occupational History    Not on file   Tobacco Use    Smoking status: Former     Current packs/day: 0.00     Types: Cigarettes     Quit date: 1994     Years since quittin.9    Smokeless tobacco: Never   Vaping Use    Vaping status: Never Used   Substance and Sexual Activity    Alcohol use: Yes     Alcohol/week: 2.0 - 3.0 standard drinks of alcohol     Types: 2 - 3 Standard drinks or equivalent per week    Drug use: Never    Sexual activity: Not Currently   Other Topics Concern    Not on file   Social History Narrative    Not on file     Social Drivers of Health     Financial Resource Strain: Not on file   Food Insecurity: Not on file   Transportation Needs: Not on file   Physical Activity: Not on file   Stress: Medium Risk (2024)    Received from [facility]    Stress     Stress in your Life: 1     Dealing with Stress: 2   Social Connections: Not on file   Intimate Partner Violence: Low Risk  (2021)    [facility], [facility]    Intimate Partner Violence     Insults You: Not on file     Threatens You: Not on file     Screams at You: Not on file     Physically Hurt: Not on file      Intimate Partner Violence Score: Not on file   Housing Stability: Not on file       Family History   Problem Relation Age of Onset    Other Mother         angina pectoris  and septicemia    Diabetes Mother     Angina Mother     Heart disease Mother     Cancer Father         laryngeal cancer    No Known Problems Sister     No Known Problems Maternal Grandmother     No Known Problems Maternal Grandfather     No Known Problems Paternal Grandmother     No Known Problems Paternal Grandfather     No Known Problems Brother     Breast cancer Maternal Aunt 83    Breast cancer Maternal Aunt 48    Bone cancer Maternal Aunt 68    Breast cancer Paternal Aunt 50

## 2024-12-04 RX ORDER — METOPROLOL SUCCINATE 25 MG/1
25 TABLET, EXTENDED RELEASE ORAL DAILY
Qty: 90 TABLET | Refills: 1 | Status: SHIPPED | OUTPATIENT
Start: 2024-12-04

## 2024-12-24 ENCOUNTER — TELEPHONE (OUTPATIENT)
Dept: ADMINISTRATIVE | Facility: OTHER | Age: 69
End: 2024-12-24

## 2024-12-24 NOTE — TELEPHONE ENCOUNTER
----- Message from Mishel MI sent at 12/23/2024  2:40 PM EST -----  Regarding: care gap request  12/23/24 2:40 PM    Hello, our patient attached above has had DEXA Scan completed/performed. Please assist in updating the patient chart by pulling the Care Everywhere (CE) document. The date of service is 12/20/2024.     Thank you,  Mishel Heck  [facility]

## 2024-12-24 NOTE — TELEPHONE ENCOUNTER
Upon review of the In Basket request we were able to locate, review, and update the patient chart as requested for DEXA Scan.    Any additional questions or concerns should be emailed to the Practice Liaisons via the appropriate education email address, please do not reply via In Basket.    Thank you  He Adam MA   [facility]

## 2025-03-03 ENCOUNTER — OFFICE VISIT (OUTPATIENT)
Dept: OBGYN CLINIC | Facility: MEDICAL CENTER | Age: 70
End: 2025-03-03
Payer: COMMERCIAL

## 2025-03-03 VITALS — WEIGHT: 241 LBS | BODY MASS INDEX: 42.7 KG/M2 | HEIGHT: 63 IN

## 2025-03-03 DIAGNOSIS — M25.562 CHRONIC PAIN OF LEFT KNEE: Primary | ICD-10-CM

## 2025-03-03 DIAGNOSIS — G89.29 CHRONIC PAIN OF LEFT KNEE: Primary | ICD-10-CM

## 2025-03-03 DIAGNOSIS — M17.12 PRIMARY OSTEOARTHRITIS OF LEFT KNEE: ICD-10-CM

## 2025-03-03 PROCEDURE — 99212 OFFICE O/P EST SF 10 MIN: CPT | Performed by: EMERGENCY MEDICINE

## 2025-03-03 NOTE — PROGRESS NOTES
Assessment/Plan:    Diagnoses and all orders for this visit:    Chronic pain of left knee    Primary osteoarthritis of left knee    Severe OA  Still with benefit from last CSI, we will hold off on repeat.  Also discussed Visco  She will continue low impact exercise at the [facility], t/c PT    Return if symptoms worsen or fail to improve.      Subjective:   Patient ID: Elizabeth Yang is a 69 y.o. female.    Elizabeth returns to the office noting continued improvement of the left knee s/p repeat CSI.  She has been bowling and also exercising at the gym at the [facility], will be trying chair yoga.    She just retired        Review of Systems    The following portions of the patient's chart were reviewed and updated as appropriate:   Allergy:    Allergies   Allergen Reactions    Lisinopril Cough    Epinephrine Nausea Only and Palpitations     Other reaction(s): Nausea and/or vomiting  Other reaction(s): Palpitations  Other reaction(s): Palpitations       Medications:    Current Outpatient Medications:     aspirin 81 MG tablet, Take by mouth, Disp: , Rfl:     Calcium Carb-Cholecalciferol 600-800 MG-UNIT TABS, Take by mouth, Disp: , Rfl:     Flaxseed, Linseed, (FLAX SEED OIL) 1300 MG CAPS, Take by mouth, Disp: , Rfl:     metoprolol succinate (TOPROL-XL) 25 mg 24 hr tablet, TAKE 1 TABLET BY MOUTH EVERY DAY, Disp: 90 tablet, Rfl: 1    rosuvastatin (CRESTOR) 20 MG tablet, Take 1 tablet (20 mg total) by mouth daily, Disp: 90 tablet, Rfl: 3    nitroglycerin (Nitrostat) 0.4 mg SL tablet, Place 1 tablet (0.4 mg total) under the tongue every 5 (five) minutes as needed (chest pain) (Patient not taking: Reported on 9/17/2024), Disp: 30 tablet, Rfl: 1    Patient Active Problem List   Diagnosis    ASCVD (arteriosclerotic cardiovascular disease)    Hypercholesterolemia    Hyperglycemia    Essential hypertension    Class 3 severe obesity due to excess calories with serious comorbidity and body mass index (BMI) of 40.0  "to 44.9 in adult ([facility])    Obstructive sleep apnea syndrome    Trigger middle finger of left hand    Presence of drug-eluting stent in anterior descending branch of left coronary artery    Osteoarthritis of left knee    History of colon polyps       Objective:  Ht 5' 3\" (1.6 m)   Wt 109 kg (241 lb)   BMI 42.69 kg/m²     Ortho Exam    Physical Exam      Neurologic Exam    Procedures    I have personally reviewed the written report of the pertinent studies.             Past Medical History:   Diagnosis Date    Angina of effort ([facility]) 8/31/2022    Avulsion fracture     Last Assessed:10/21/2014    Cellulitis of ankle     Last Assessed:7/18/2014    Chronic fatigue     Last Assessed:12/1/2015    Edema     Last Assessed:7/18/2014    Hypertensive heart disease without congestive heart failure 8/1/2012      Formatting of this note might be different from the original.  Formatting of this note might be different from the original. Formatting of this note might be different from the original. Images from the original note were not included.  Formatting of this note might be different from the original.  Last Assessment & Plan:  Formatting of this note might be different from the original. Stable at t    Plantar fasciitis     Last Assessed:9/27/2013    Trigger middle finger of right hand     Last Assessed:2/19/2014       Past Surgical History:   Procedure Laterality Date    BREAST BIOPSY Right 09/18/2009    ultrasound guided core biopsy-benign    CARDIAC CATHETERIZATION      COLONOSCOPY      7/14    CORONARY ANGIOPLASTY WITH STENT PLACEMENT      CORONARY ANGIOPLASTY WITH STENT PLACEMENT      11/07    [facility]      1988       Social History     Socioeconomic History    Marital status: Single     Spouse name: Not on file    Number of children: Not on file    Years of education: Not on file    Highest education level: Not on file   Occupational History    Not on file   Tobacco Use    Smoking status: Former     Current packs/day: 0.00 "     Types: Cigarettes     Quit date: 1994     Years since quittin.1    Smokeless tobacco: Never   Vaping Use    Vaping status: Never Used   Substance and Sexual Activity    Alcohol use: Yes     Alcohol/week: 2.0 - 3.0 standard drinks of alcohol     Types: 2 - 3 Standard drinks or equivalent per week    Drug use: Never    Sexual activity: Not Currently   Other Topics Concern    Not on file   Social History Narrative    Not on file     Social Drivers of Health     Financial Resource Strain: Not on file   Food Insecurity: Not on file   Transportation Needs: Not on file   Physical Activity: Not on file   Stress: Medium Risk (2024)    Received from [facility]    Stress     Stress in your Life: 1     Dealing with Stress: 2   Social Connections: Not on file   Intimate Partner Violence: Low Risk  (2021)    Received from [facility], [facility]    Intimate Partner Violence     Insults You: Not on file     Threatens You: Not on file     Screams at You: Not on file     Physically Hurt: Not on file     Intimate Partner Violence Score: Not on file   Housing Stability: Not on file       Family History   Problem Relation Age of Onset    Other Mother         angina pectoris  and septicemia    Diabetes Mother     Angina Mother     Heart disease Mother     Cancer Father         laryngeal cancer    No Known Problems Sister     No Known Problems Maternal Grandmother     No Known Problems Maternal Grandfather     No Known Problems Paternal Grandmother     No Known Problems Paternal Grandfather     No Known Problems Brother     Breast cancer Maternal Aunt 83    Breast cancer Maternal Aunt 48    Bone cancer Maternal Aunt 68    Breast cancer Paternal Aunt 50

## 2025-03-12 ENCOUNTER — APPOINTMENT (OUTPATIENT)
Dept: LAB | Facility: MEDICAL CENTER | Age: 70
End: 2025-03-12
Payer: COMMERCIAL

## 2025-03-12 DIAGNOSIS — R73.9 HYPERGLYCEMIA: ICD-10-CM

## 2025-03-12 DIAGNOSIS — I10 ESSENTIAL HYPERTENSION: ICD-10-CM

## 2025-03-12 DIAGNOSIS — I25.10 ASCVD (ARTERIOSCLEROTIC CARDIOVASCULAR DISEASE): ICD-10-CM

## 2025-03-12 DIAGNOSIS — E78.00 HYPERCHOLESTEROLEMIA: ICD-10-CM

## 2025-03-12 LAB
ALBUMIN SERPL BCG-MCNC: 4.2 G/DL (ref 3.5–5)
ALP SERPL-CCNC: 75 U/L (ref 34–104)
ALT SERPL W P-5'-P-CCNC: 16 U/L (ref 7–52)
ANION GAP SERPL CALCULATED.3IONS-SCNC: 7 MMOL/L (ref 4–13)
AST SERPL W P-5'-P-CCNC: 18 U/L (ref 13–39)
BASOPHILS # BLD AUTO: 0.03 THOUSANDS/ÂΜL (ref 0–0.1)
BASOPHILS NFR BLD AUTO: 0 % (ref 0–1)
BILIRUB SERPL-MCNC: 0.86 MG/DL (ref 0.2–1)
BUN SERPL-MCNC: 14 MG/DL (ref 5–25)
CALCIUM SERPL-MCNC: 9.3 MG/DL (ref 8.4–10.2)
CHLORIDE SERPL-SCNC: 103 MMOL/L (ref 96–108)
CHOLEST SERPL-MCNC: 101 MG/DL (ref ?–200)
CO2 SERPL-SCNC: 30 MMOL/L (ref 21–32)
CREAT SERPL-MCNC: 0.6 MG/DL (ref 0.6–1.3)
EOSINOPHIL # BLD AUTO: 0.12 THOUSAND/ÂΜL (ref 0–0.61)
EOSINOPHIL NFR BLD AUTO: 2 % (ref 0–6)
ERYTHROCYTE [DISTWIDTH] IN BLOOD BY AUTOMATED COUNT: 12.8 % (ref 11.6–15.1)
EST. AVERAGE GLUCOSE BLD GHB EST-MCNC: 108 MG/DL
GFR SERPL CREATININE-BSD FRML MDRD: 93 ML/MIN/1.73SQ M
GLUCOSE P FAST SERPL-MCNC: 100 MG/DL (ref 65–99)
HBA1C MFR BLD: 5.4 %
HCT VFR BLD AUTO: 40.7 % (ref 34.8–46.1)
HDLC SERPL-MCNC: 57 MG/DL
HGB BLD-MCNC: 13.4 G/DL (ref 11.5–15.4)
IMM GRANULOCYTES # BLD AUTO: 0.02 THOUSAND/UL (ref 0–0.2)
IMM GRANULOCYTES NFR BLD AUTO: 0 % (ref 0–2)
LDLC SERPL CALC-MCNC: 32 MG/DL (ref 0–100)
LYMPHOCYTES # BLD AUTO: 1.41 THOUSANDS/ÂΜL (ref 0.6–4.47)
LYMPHOCYTES NFR BLD AUTO: 21 % (ref 14–44)
MCH RBC QN AUTO: 30.5 PG (ref 26.8–34.3)
MCHC RBC AUTO-ENTMCNC: 32.9 G/DL (ref 31.4–37.4)
MCV RBC AUTO: 93 FL (ref 82–98)
MONOCYTES # BLD AUTO: 0.5 THOUSAND/ÂΜL (ref 0.17–1.22)
MONOCYTES NFR BLD AUTO: 8 % (ref 4–12)
NEUTROPHILS # BLD AUTO: 4.6 THOUSANDS/ÂΜL (ref 1.85–7.62)
NEUTS SEG NFR BLD AUTO: 69 % (ref 43–75)
NRBC BLD AUTO-RTO: 0 /100 WBCS
PLATELET # BLD AUTO: 176 THOUSANDS/UL (ref 149–390)
PMV BLD AUTO: 10.4 FL (ref 8.9–12.7)
POTASSIUM SERPL-SCNC: 4.4 MMOL/L (ref 3.5–5.3)
PROT SERPL-MCNC: 6.7 G/DL (ref 6.4–8.4)
RBC # BLD AUTO: 4.4 MILLION/UL (ref 3.81–5.12)
SODIUM SERPL-SCNC: 140 MMOL/L (ref 135–147)
TRIGL SERPL-MCNC: 58 MG/DL (ref ?–150)
WBC # BLD AUTO: 6.68 THOUSAND/UL (ref 4.31–10.16)

## 2025-03-12 PROCEDURE — 83036 HEMOGLOBIN GLYCOSYLATED A1C: CPT

## 2025-03-12 PROCEDURE — 80061 LIPID PANEL: CPT

## 2025-03-12 PROCEDURE — 85025 COMPLETE CBC W/AUTO DIFF WBC: CPT

## 2025-03-12 PROCEDURE — 36415 COLL VENOUS BLD VENIPUNCTURE: CPT

## 2025-03-12 PROCEDURE — 80053 COMPREHEN METABOLIC PANEL: CPT

## 2025-03-14 ENCOUNTER — RESULTS FOLLOW-UP (OUTPATIENT)
Dept: FAMILY MEDICINE CLINIC | Facility: CLINIC | Age: 70
End: 2025-03-14

## 2025-03-17 ENCOUNTER — RA CDI HCC (OUTPATIENT)
Dept: OTHER | Facility: HOSPITAL | Age: 70
End: 2025-03-17

## 2025-03-21 ENCOUNTER — OFFICE VISIT (OUTPATIENT)
Dept: FAMILY MEDICINE CLINIC | Facility: CLINIC | Age: 70
End: 2025-03-21
Payer: COMMERCIAL

## 2025-03-21 VITALS
DIASTOLIC BLOOD PRESSURE: 82 MMHG | BODY MASS INDEX: 40.74 KG/M2 | OXYGEN SATURATION: 97 % | WEIGHT: 230 LBS | RESPIRATION RATE: 14 BRPM | HEART RATE: 69 BPM | SYSTOLIC BLOOD PRESSURE: 120 MMHG

## 2025-03-21 DIAGNOSIS — I10 ESSENTIAL HYPERTENSION: ICD-10-CM

## 2025-03-21 DIAGNOSIS — E66.01 CLASS 3 SEVERE OBESITY DUE TO EXCESS CALORIES WITH SERIOUS COMORBIDITY AND BODY MASS INDEX (BMI) OF 40.0 TO 44.9 IN ADULT (HCC): ICD-10-CM

## 2025-03-21 DIAGNOSIS — G47.33 OBSTRUCTIVE SLEEP APNEA SYNDROME: ICD-10-CM

## 2025-03-21 DIAGNOSIS — I25.10 ASCVD (ARTERIOSCLEROTIC CARDIOVASCULAR DISEASE): Primary | ICD-10-CM

## 2025-03-21 DIAGNOSIS — E78.00 HYPERCHOLESTEROLEMIA: ICD-10-CM

## 2025-03-21 DIAGNOSIS — R73.9 HYPERGLYCEMIA: ICD-10-CM

## 2025-03-21 DIAGNOSIS — Z95.5 PRESENCE OF DRUG-ELUTING STENT IN ANTERIOR DESCENDING BRANCH OF LEFT CORONARY ARTERY: ICD-10-CM

## 2025-03-21 DIAGNOSIS — E66.813 CLASS 3 SEVERE OBESITY DUE TO EXCESS CALORIES WITH SERIOUS COMORBIDITY AND BODY MASS INDEX (BMI) OF 40.0 TO 44.9 IN ADULT (HCC): ICD-10-CM

## 2025-03-21 DIAGNOSIS — M17.12 PRIMARY OSTEOARTHRITIS OF LEFT KNEE: ICD-10-CM

## 2025-03-21 PROCEDURE — G2211 COMPLEX E/M VISIT ADD ON: HCPCS | Performed by: FAMILY MEDICINE

## 2025-03-21 PROCEDURE — 99214 OFFICE O/P EST MOD 30 MIN: CPT | Performed by: FAMILY MEDICINE

## 2025-03-21 RX ORDER — NITROGLYCERIN 0.4 MG/1
0.4 TABLET SUBLINGUAL
Qty: 30 TABLET | Refills: 1 | Status: SHIPPED | OUTPATIENT
Start: 2025-03-21

## 2025-03-21 NOTE — ASSESSMENT & PLAN NOTE
Well-controlled at this time.  Orders:    CBC and differential; Future    Comprehensive metabolic panel; Future    Lipid Panel with Direct LDL reflex; Future

## 2025-03-21 NOTE — ASSESSMENT & PLAN NOTE
Stable with CPAP  Orders:    CBC and differential; Future    Comprehensive metabolic panel; Future

## 2025-03-21 NOTE — PATIENT INSTRUCTIONS
Thank you for your inquiry about the measles vaccine.  Please review the below information and let your [facility]'s Provider know if you would like to schedule your MMR vaccine. Patients covered by any Medicare product should contact their insurance carrier to determine the location at which they need to obtain their vaccination (in office or retail pharmacy).    CDC Link:  Measles Vaccination  Measles ([place])  CDC)  Ask the Expert Link:  MMR (Measles, Mumps, and Rubella)  Vaccine Recommendations    Why is vaccination so important in preventing outbreaks?  Measles is highly contagious such that 90% of unvaccinated people who are exposed to measles will develop symptomatic infection.  Outbreaks therefore usually occur among unvaccinated individuals or infants who have not yet had the opportunity to be vaccinated.    How effective is the current live-attenuated MMR vaccine?  One dose of MMR is 93% effective against measles.  Two doses are 97% effective.      What are current recommendations for vaccination against measles?  Measles vaccine is formulated in the [place] as the measles, mumps, and rubella (MMR) vaccine.  It is currently recommended that children receive 2 doses of MMR, the first dose at 12-15 months old and the second dose at 4-6 years old.  In cases of a local outbreak or international travel, infants can receive their first dose of MMR as early as 6 months old, but will still need additional doses at 12-15 months and 4-6 years to be considered fully vaccinated  Ask the Expert:  MMR (Measles, Mumps, and Rubella)  Scheduling Vaccines    What is considered adequate immunity to measles in adults?  Presumptive evidence of immunity is defined as meeting any one of the following criteria:  Written documentation of at least one dose of live attenuated measles vaccine (e.g., after 1968)  Laboratory confirmation of disease  Laboratory evidence of immunity  Birth before 1956*  *Healthcare personnel born before  1956 who lack laboratory evidence of immunity or laboratory confirmation of disease should be considered for vaccination.  CDC Link:  Measles Vaccine Recommendations  Measles ([place])  CDC    What if a patient does not have written documentation of their vaccine history?   Recommendations should be based on the year of birth of the individual.  Patients born before 1957:   presumed immune, no vaccine doses indicated (unless working in healthcare)  Patients born [birth year]:  should be offered at least one dose of current MMR  Patients born [birth year]-0154:  may have only gotten one dose of MMR as a child, may be offered a second dose  Patients born after 1989: should have gotten two doses as a child, no additional doses needed   Note:  If someone has unknown vaccine history but has evidence of positive antibody titers, they can be considered immune and would not need additional vaccine doses.  As background, some versions of the measles vaccine offered prior to 1968 were inactivated/killed and not as effective as the current live attenuated version.  Between 1968 and 1989, routine measles vaccination included only one dose of MMR.  In 1989, in response to outbreaks of measles across, MMR was expanded to a 2-dose schedule.  Some pediatricians proactively immunized adolescents at this time with a second dose, but many did not.   Note:  For some occupations, documentation of measles immunity is required.  If no documentation is available and the patient was born after 1956, 2 documented doses of MMR vaccine should be given,  by at least 28 days.    Who should consider a second shot of MMR if they previously only received one dose?  College students  International travelers  Healthcare personnel  Close contacts of immunocompromised people  People with HIV infection (but not those who are severely immunocompromised)  Adults who got inactivated measles vaccine (e.g., prior to 1968)  Groups at increased risk during  measles outbreaks  CDC Link:  Measles Vaccination for Specific Groups  Measles ([place])  CDC    If someone has adequate evidence of immunity or prior vaccination, do they need a “booster” in light of recent outbreaks?  No.  Given the high level of long-lasting immunity provided by prior infection or vaccination, there is no added benefit of additional doses of MMR outside of those recommended above.      If someone has adequate evidence of immunity or prior vaccination, should they check their antibody titers to ensure their immunity?  No.  Documentation of appropriate vaccination supersedes the results of serologic testing for measles.  Titer tests detect the presence of antibodies at high concentrations.  Measles immunity is based more on cell-mediated immunity.  So even if the titer tests do not show high levels of antibodies, memory cells developed in response to prior infection or vaccination can still respond quickly to infection.      Who CANNOT get the MMR vaccine?  Because MMR is a live attenuated viral vaccine, it should not be administered to individuals who are pregnant or have severe immunodeficiency (e.g., hematologic and solid tumors, receipt of chemotherapy, congenital immunodeficiency, long-term immunosuppressive therapy, or patients with HIV infection who are severely immunocompromised).  Note:  Household contacts of individuals who are pregnant or have severe immunodeficiency can receive the MMR vaccine as there is no risk for viral shedding from the vaccine that would put others at risk.  CDC Link:  Contraindications and Precautions  Vaccines & Immunizations  CDC  Ask the Experts Link:   MMR (Measles, Mumps, and Rubella)  Contraindications & Precautions    Should patients traveling internationally take any extra precautions?  There are ongoing outbreaks across the globe.  The top 10 countries with ongoing measles outbreaks in order are:  [place], [place], [place], [place], [place],  [place], [place], [place], [place], [place].  Anyone planning to travel internationally (i.e., outside the [place]) should ensure they are fully vaccinated at least 2 weeks prior to departure.  Unvaccinated adults and children can be vaccinated using an accelerated vaccine schedule to offer optimal protection (see link below).  [facility] Link:  Plan for Travel  Measles ([place])  [facility]    Is the MMR vaccine safe?  Yes.  Getting MMR vaccine is much safer than getting measles, mumps, or rubella.  Common side effects of the MMR vaccine are mild and include arm soreness, fever, mild rash, temporary pain and stiffness in the joints, mostly in teenage or adult women who did not already have immunity to the rubella component of the vaccine.    Some people may also experience swelling in the cheeks or neck.  MMR vaccine rarely causes a temporary low platelet count, which can cause a bleeding disorder that usually goes away without treatment and is not life threatening.  MMR vaccine has been linked with a very small risk of febrile seizures that are not associated with any long-term effects.  Because the risk of febrile seizures increases as infants get older, it is recommended that they get vaccinated as soon as recommended.  It is important to note that extensive, large-scale international studies have shown that MMR does NOT cause autism.  More information:  Measles, Mumps, Rubella (MMR) Vaccine Safety  Vaccine Safety  CDC    How can I best communicate the importance of vaccination to my patients?  The AAP has excellent resources for initiating these conversations.  AAP link:  Communicating with Families and Promoting Vaccine Confidence    Where can patients get the MMR vaccine if a dose is indicated?  Most PCP practices as well as our Pediatric offices have MMR in-stock.  Patients should contact their PCP or Pediatrician to schedule an appointment. Medicare patients are required to have the vaccine at their  pharmacy in order for this to be covered by their plan.    Other helpful pediatric links are below:    https://www.healthychildren.org/English/health-issues/vaccine-preventable-diseases/Pages/Measles.aspx    https://www.healthychildren.org/English/safety-prevention/immunizations/Pages/how-to-protect-your-children-during-a-measles-outbreak.aspx    https://www.healthychildren.org/English/safety-prevention/immunizations/Pages/Protecting-Your-Baby-from-a-Measles-Outbreak-FAQs.aspx

## 2025-03-21 NOTE — ASSESSMENT & PLAN NOTE
She is going to the gym and working on weight loss.  Orders:    CBC and differential; Future    Comprehensive metabolic panel; Future    Hemoglobin A1C; Future    Lipid Panel with Direct LDL reflex; Future

## 2025-03-21 NOTE — ASSESSMENT & PLAN NOTE
Continue current statin.  Orders:    CBC and differential; Future    Comprehensive metabolic panel; Future    Lipid Panel with Direct LDL reflex; Future

## 2025-03-21 NOTE — PROGRESS NOTES
Name: Elizabeth Yang      : 1955      MRN: [id number]  Encounter Provider: Edenilson Rosario Jr, MD  Encounter Date: 3/21/2025   Encounter department: [facility], [facility]–1983 PRIMARY CARE  :  Assessment & Plan  ASCVD (arteriosclerotic cardiovascular disease)  She remains clinically stable.  Refill glycerin to have on hand.  She is never required this.  Orders:    nitroglycerin (Nitrostat) 0.4 mg SL tablet; Place 1 tablet (0.4 mg total) under the tongue every 5 (five) minutes as needed (chest pain)    CBC and differential; Future    Comprehensive metabolic panel; Future    Lipid Panel with Direct LDL reflex; Future    Essential hypertension  Well-controlled at this time.  Orders:    CBC and differential; Future    Comprehensive metabolic panel; Future    Lipid Panel with Direct LDL reflex; Future    Obstructive sleep apnea syndrome  Stable with CPAP  Orders:    CBC and differential; Future    Comprehensive metabolic panel; Future    Hyperglycemia  Monitor A1c  Orders:    Comprehensive metabolic panel; Future    Hemoglobin A1C; Future    Hypercholesterolemia  Continue current statin.  Orders:    CBC and differential; Future    Comprehensive metabolic panel; Future    Lipid Panel with Direct LDL reflex; Future    Class 3 severe obesity due to excess calories with serious comorbidity and body mass index (BMI) of 40.0 to 44.9 in adult (HCC)    She is going to the gym and working on weight loss.  Orders:    CBC and differential; Future    Comprehensive metabolic panel; Future    Hemoglobin A1C; Future    Lipid Panel with Direct LDL reflex; Future    Primary osteoarthritis of left knee  She is following with orthopedics intermittently.       Presence of drug-eluting stent in anterior descending branch of left coronary artery  Continue risk factor modification.              History of Present Illness   HPI the patient presents today for a hypertension follow-up.  Patient remains compliant with medications and  denies any chest pain, shortness of breath, palpitations, lightheadedness or diaphoresis.  She does see cardiology for history of stent.  She is exercising more and having no chest pain, shortness of breath or palpitations.  Glucose is mildly elevated but she is watching her carbohydrates.  Review of Systems   Constitutional:  Negative for appetite change, chills, fatigue, fever and unexpected weight change.   HENT:  Negative for trouble swallowing.    Eyes:  Negative for visual disturbance.   Respiratory:  Negative for cough, chest tightness, shortness of breath and wheezing.    Cardiovascular:  Negative for chest pain, palpitations and leg swelling.   Gastrointestinal:  Negative for abdominal distention, abdominal pain, blood in stool, constipation and diarrhea.   Endocrine: Negative for polyuria.   Genitourinary:  Negative for difficulty urinating and flank pain.   Musculoskeletal:  Negative for arthralgias and myalgias.   Skin:  Negative for rash.   Neurological:  Negative for dizziness and light-headedness.   Hematological:  Negative for adenopathy. Does not bruise/bleed easily.   Psychiatric/Behavioral:  Negative for dysphoric mood and sleep disturbance. The patient is not nervous/anxious.        Objective   /82 (BP Location: Left arm, Patient Position: Sitting, Cuff Size: Standard)   Pulse 69   Resp 14   Wt 104 kg (230 lb)   SpO2 97%   BMI 40.74 kg/m²      Physical Exam  Constitutional:       General: She is not in acute distress.     Appearance: She is well-developed. She is obese. She is not diaphoretic.   HENT:      Head: Normocephalic.      Right Ear: External ear normal.      Left Ear: External ear normal.      Nose: Nose normal.   Eyes:      General: No scleral icterus.        Right eye: No discharge.         Left eye: No discharge.      Conjunctiva/sclera: Conjunctivae normal.      Pupils: Pupils are equal, round, and reactive to light.   Neck:      Thyroid: No thyromegaly.      Trachea: No  tracheal deviation.   Cardiovascular:      Rate and Rhythm: Normal rate and regular rhythm.      Heart sounds: Normal heart sounds. No murmur heard.  Pulmonary:      Effort: Pulmonary effort is normal. No respiratory distress.      Breath sounds: Normal breath sounds. No stridor. No wheezing, rhonchi or rales.   Abdominal:      General: Bowel sounds are normal. There is no distension.      Palpations: Abdomen is soft.      Tenderness: There is no abdominal tenderness. There is no guarding.   Musculoskeletal:         General: No tenderness or deformity. Normal range of motion.      Right lower leg: No edema.      Left lower leg: No edema.   Lymphadenopathy:      Cervical: No cervical adenopathy.   Skin:     General: Skin is warm.      Coloration: Skin is not pale.      Findings: No erythema or rash.   Neurological:      Cranial Nerves: No cranial nerve deficit.      Coordination: Coordination normal.   Psychiatric:         Mood and Affect: Mood normal.         Behavior: Behavior normal.         Thought Content: Thought content normal.

## 2025-03-21 NOTE — ASSESSMENT & PLAN NOTE
She remains clinically stable.  Refill glycerin to have on hand.  She is never required this.  Orders:    nitroglycerin (Nitrostat) 0.4 mg SL tablet; Place 1 tablet (0.4 mg total) under the tongue every 5 (five) minutes as needed (chest pain)    CBC and differential; Future    Comprehensive metabolic panel; Future    Lipid Panel with Direct LDL reflex; Future

## 2025-05-29 DIAGNOSIS — E78.00 HYPERCHOLESTEROLEMIA: ICD-10-CM

## 2025-05-29 DIAGNOSIS — I25.10 ASCVD (ARTERIOSCLEROTIC CARDIOVASCULAR DISEASE): ICD-10-CM

## 2025-05-29 RX ORDER — METOPROLOL SUCCINATE 25 MG/1
25 TABLET, EXTENDED RELEASE ORAL DAILY
Qty: 90 TABLET | Refills: 1 | Status: SHIPPED | OUTPATIENT
Start: 2025-05-29

## 2025-05-30 DIAGNOSIS — E78.00 HYPERCHOLESTEROLEMIA: ICD-10-CM

## 2025-05-30 RX ORDER — ROSUVASTATIN CALCIUM 20 MG/1
20 TABLET, COATED ORAL DAILY
Qty: 90 TABLET | Refills: 1 | Status: SHIPPED | OUTPATIENT
Start: 2025-05-30

## 2025-07-03 ENCOUNTER — OFFICE VISIT (OUTPATIENT)
Dept: URGENT CARE | Age: 70
End: 2025-07-03
Payer: COMMERCIAL

## 2025-07-03 VITALS
HEART RATE: 77 BPM | DIASTOLIC BLOOD PRESSURE: 77 MMHG | WEIGHT: 241.4 LBS | OXYGEN SATURATION: 97 % | SYSTOLIC BLOOD PRESSURE: 153 MMHG | TEMPERATURE: 97.6 F | BODY MASS INDEX: 42.77 KG/M2 | RESPIRATION RATE: 18 BRPM | HEIGHT: 63 IN

## 2025-07-03 DIAGNOSIS — J40 BRONCHITIS: Primary | ICD-10-CM

## 2025-07-03 PROCEDURE — 99213 OFFICE O/P EST LOW 20 MIN: CPT | Performed by: STUDENT IN AN ORGANIZED HEALTH CARE EDUCATION/TRAINING PROGRAM

## 2025-07-03 RX ORDER — BENZONATATE 100 MG/1
100 CAPSULE ORAL 3 TIMES DAILY PRN
Qty: 20 CAPSULE | Refills: 0 | Status: SHIPPED | OUTPATIENT
Start: 2025-07-03

## 2025-07-03 NOTE — PROGRESS NOTES
"  [facility]        NAME: Elizabeth Yang is a 69 y.o. female  : 1955    MRN: [id number]  DATE: July 3, 2025  TIME: 1:10 PM    Assessment and Plan   Bronchitis [J40]  1. Bronchitis  amoxicillin-clavulanate (AUGMENTIN) 875-125 mg per tablet    benzonatate (TESSALON PERLES) 100 mg capsule            Patient Instructions       Follow up with PCP in 3-5 days.  Proceed to  ER if symptoms worsen.    If tests have been performed at [facility], our office will contact you with results if changes need to be made to the care plan discussed with you at the visit.  You can review your full results on [facility] MyChart.    Chief Complaint     Chief Complaint   Patient presents with    Cough     Pt started with an occasional productive cough for green/yellow sputum for one week.  Pt also complains of sinus congestion and head congestion.  She states \"I feel like something is in my throat and I can not get it out.\"         History of Present Illness       URI   This is a new problem. The current episode started 1 to 4 weeks ago. The problem has been gradually worsening. There has been no fever. Associated symptoms include congestion, coughing, sinus pain and a sore throat. Pertinent negatives include no abdominal pain, ear pain, headaches, joint pain, neck pain, plugged ear sensation or swollen glands. She has tried decongestant for the symptoms. The treatment provided no relief.       Review of Systems   Review of Systems   HENT:  Positive for congestion, sinus pain and sore throat. Negative for ear pain.    Respiratory:  Positive for cough.    Gastrointestinal:  Negative for abdominal pain.   Musculoskeletal:  Negative for joint pain and neck pain.   Neurological:  Negative for headaches.       As stated in HPI      Current Medications     Current Medications[1]    Current Allergies     Allergies as of 2025 - Reviewed 2025   Allergen Reaction Noted    Lisinopril Cough 2015    Epinephrine Nausea " "Only and Palpitations 03/19/2013            The following portions of the patient's history were reviewed and updated as appropriate: allergies, current medications, past family history, past medical history, past social history, past surgical history and problem list.     Past Medical History[2]    Past Surgical History[3]    Family History[4]      Medications have been verified.        Objective   /77 (BP Location: Left arm, Patient Position: Sitting)   Pulse 77   Temp 97.6 °F (36.4 °C) (Tympanic)   Resp 18   Ht 5' 3\" (1.6 m)   Wt 109 kg (241 lb 6.4 oz)   SpO2 97%   BMI 42.76 kg/m²   No LMP recorded. Patient is postmenopausal.       Physical Exam     Physical Exam  Constitutional:       General: She is not in acute distress.     Appearance: Normal appearance. She is not toxic-appearing.   HENT:      Head: Normocephalic and atraumatic.      Right Ear: Tympanic membrane, ear canal and external ear normal.      Left Ear: Tympanic membrane, ear canal and external ear normal.      Nose: Congestion present.     Cardiovascular:      Rate and Rhythm: Normal rate.   Pulmonary:      Effort: Pulmonary effort is normal.      Breath sounds: Normal breath sounds. No wheezing.     Neurological:      General: No focal deficit present.      Mental Status: She is alert.     Psychiatric:         Mood and Affect: Mood normal.                        [1]   Current Outpatient Medications:     amoxicillin-clavulanate (AUGMENTIN) 875-125 mg per tablet, Take 1 tablet by mouth every 12 (twelve) hours for 7 days, Disp: 14 tablet, Rfl: 0    aspirin 81 MG tablet, Take by mouth, Disp: , Rfl:     benzonatate (TESSALON PERLES) 100 mg capsule, Take 1 capsule (100 mg total) by mouth 3 (three) times a day as needed for cough, Disp: 20 capsule, Rfl: 0    Calcium Carb-Cholecalciferol 600-800 MG-UNIT TABS, Take by mouth, Disp: , Rfl:     Flaxseed, Linseed, (FLAX SEED OIL) 1300 MG CAPS, Take by mouth, Disp: , Rfl:     metoprolol succinate " (TOPROL-XL) 25 mg 24 hr tablet, TAKE 1 TABLET BY MOUTH EVERY DAY, Disp: 90 tablet, Rfl: 1    nitroglycerin (Nitrostat) 0.4 mg SL tablet, Place 1 tablet (0.4 mg total) under the tongue every 5 (five) minutes as needed (chest pain), Disp: 30 tablet, Rfl: 1    rosuvastatin (CRESTOR) 20 MG tablet, TAKE 1 TABLET BY MOUTH EVERY DAY, Disp: 90 tablet, Rfl: 1  [2]   Past Medical History:  Diagnosis Date    Angina of effort (HCC) 8/31/2022    Avulsion fracture     Last Assessed:10/21/2014    Cellulitis of ankle     Last Assessed:7/18/2014    Chronic fatigue     Last Assessed:12/1/2015    Edema     Last Assessed:7/18/2014    Hypertensive heart disease without congestive heart failure 8/1/2012      Formatting of this note might be different from the original.  Formatting of this note might be different from the original. Formatting of this note might be different from the original. Images from the original note were not included.  Formatting of this note might be different from the original.  Last Assessment & Plan:  Formatting of this note might be different from the original. Stable at t    Plantar fasciitis     Last Assessed:9/27/2013    Trigger middle finger of right hand     Last Assessed:2/19/2014   [3]   Past Surgical History:  Procedure Laterality Date    BREAST BIOPSY Right 09/18/2009    ultrasound guided core biopsy-benign    CARDIAC CATHETERIZATION      COLONOSCOPY      7/14    CORONARY ANGIOPLASTY WITH STENT PLACEMENT      CORONARY ANGIOPLASTY WITH STENT PLACEMENT      11/07    ERCP      1988   [4]   Family History  Problem Relation Name Age of Onset    Other Mother          angina pectoris  and septicemia    Diabetes Mother      Angina Mother      Heart disease Mother      Cancer Father          laryngeal cancer    No Known Problems Sister Catarina     No Known Problems Maternal Grandmother      No Known Problems Maternal Grandfather      No Known Problems Paternal Grandmother      No Known Problems Paternal  Grandfather      No Known Problems Brother      Breast cancer Maternal Aunt Marian 83    Breast cancer Maternal Aunt Pallavi 48    Bone cancer Maternal Aunt Pallavi 68    Breast cancer Paternal Aunt Nela 50